# Patient Record
Sex: MALE | Race: WHITE | NOT HISPANIC OR LATINO | Employment: FULL TIME | URBAN - METROPOLITAN AREA
[De-identification: names, ages, dates, MRNs, and addresses within clinical notes are randomized per-mention and may not be internally consistent; named-entity substitution may affect disease eponyms.]

---

## 2018-04-12 ENCOUNTER — OFFICE VISIT (OUTPATIENT)
Dept: URGENT CARE | Facility: PHYSICIAN GROUP | Age: 33
End: 2018-04-12
Payer: COMMERCIAL

## 2018-04-12 VITALS
OXYGEN SATURATION: 96 % | HEART RATE: 46 BPM | SYSTOLIC BLOOD PRESSURE: 100 MMHG | WEIGHT: 201.4 LBS | TEMPERATURE: 98.4 F | BODY MASS INDEX: 25.85 KG/M2 | HEIGHT: 74 IN | DIASTOLIC BLOOD PRESSURE: 70 MMHG

## 2018-04-12 DIAGNOSIS — M54.42 ACUTE LEFT-SIDED LOW BACK PAIN WITH LEFT-SIDED SCIATICA: ICD-10-CM

## 2018-04-12 PROCEDURE — 99214 OFFICE O/P EST MOD 30 MIN: CPT | Performed by: PHYSICIAN ASSISTANT

## 2018-04-12 RX ORDER — METHYLPREDNISOLONE 4 MG/1
TABLET ORAL
Qty: 21 TAB | Refills: 0 | Status: SHIPPED | OUTPATIENT
Start: 2018-04-12 | End: 2018-09-26

## 2018-04-12 ASSESSMENT — ENCOUNTER SYMPTOMS
BACK PAIN: 1
LEG PAIN: 1
CARDIOVASCULAR NEGATIVE: 1
CHANGE IN BOWEL HABIT: 0
RESPIRATORY NEGATIVE: 1
GASTROINTESTINAL NEGATIVE: 1
CONSTITUTIONAL NEGATIVE: 1

## 2018-04-12 ASSESSMENT — PAIN SCALES - GENERAL: PAINLEVEL: 8=MODERATE-SEVERE PAIN

## 2018-04-12 NOTE — PROGRESS NOTES
"Subjective:      Leroy Thao is a 33 y.o. male who presents with Leg Pain (L knee pain, tender, painfiul when extending leg, onset 30 hrs)            Leg Pain   This is a new problem. The current episode started yesterday. The problem occurs constantly. The problem has been unchanged. Pertinent negatives include no change in bowel habit. The symptoms are aggravated by walking and bending. He has tried acetaminophen for the symptoms. The treatment provided mild relief.   Atraumatic left lumbar pain extending into his left SI joint and down his posterior leg into his calf. No injuries, falls. Denies bowel/bladder incontinence, Perianal numbness. Fevers, chills, abdominal pain, chest pain. No previous injuries.      PMH:  has no past medical history on file.  MEDS:   Current Outpatient Prescriptions:   •  hydrocodone/acetaminophen (NORCO)  MG Tab, Take 1-2 Tabs by mouth every 6 hours as needed., Disp: 10 Tab, Rfl: 0  ALLERGIES: No Known Allergies  SURGHX: History reviewed. No pertinent surgical history.  SOCHX:  reports that he has never smoked. He has never used smokeless tobacco. He reports that he drinks alcohol. He reports that he does not use drugs.  FH: family history is not on file.      Review of Systems   Constitutional: Negative.    HENT: Negative.    Respiratory: Negative.    Cardiovascular: Negative.    Gastrointestinal: Negative.  Negative for change in bowel habit.   Genitourinary: Negative.    Musculoskeletal: Positive for back pain.       Medications, Allergies, and current problem list reviewed today in Epic     Objective:     /70   Pulse (!) 46   Temp 36.9 °C (98.4 °F)   Ht 1.88 m (6' 2\")   Wt 91.4 kg (201 lb 6.4 oz)   SpO2 96%   BMI 25.86 kg/m²      Physical Exam   Constitutional: He is oriented to person, place, and time. He appears well-developed and well-nourished. No distress.   HENT:   Head: Normocephalic and atraumatic.   Right Ear: External ear normal.   Left Ear: External " ear normal.   Nose: Nose normal.   Mouth/Throat: Oropharynx is clear and moist. No oropharyngeal exudate.   Eyes: Right conjunctiva is not injected. Left conjunctiva is not injected.   Neck: Normal range of motion. Neck supple.   Cardiovascular: Normal rate, regular rhythm and normal heart sounds.    No murmur heard.  Pulmonary/Chest: Effort normal and breath sounds normal. No respiratory distress. He has no wheezes. He exhibits no tenderness.   Musculoskeletal:        Lumbar back: He exhibits decreased range of motion, tenderness and pain. He exhibits no bony tenderness, no swelling and no spasm.        Back:    Left lumbar/SI joint tenderness. No midline tenderness. Straight leg raise positive. Lower extremity strength and DTRs equal. Nonantalgic gait. Forward flexion within normal limits.   Neurological: He is alert and oriented to person, place, and time.   Skin: Skin is warm and dry. He is not diaphoretic.   Psychiatric: He has a normal mood and affect. His behavior is normal. Judgment and thought content normal.   Nursing note and vitals reviewed.              Assessment/Plan:     1. Acute left-sided low back pain with left-sided sciatica  MethylPREDNISolone (MEDROL DOSEPAK) 4 MG Tablet Therapy Pack     Vital signs normal, no red flag symptoms. Left-sided sciatica  Trial Medrol Dosepak  OTC meds and conservative measures as discussed, handout given  Return to clinic or go to ED if symptoms worsen or persist. Indications for ED discussed at length. Patient voices understanding. Follow-up with your primary care provider in 3-5 days. Red flags discussed. All side effects of medication discussed including allergic response, GI upset, tendon injury, etc.    Please note that this dictation was created using voice recognition software. I have made every reasonable attempt to correct obvious errors, but I expect that there are errors of grammar and possibly content that I did not discover before finalizing the  note.

## 2018-04-16 ENCOUNTER — OFFICE VISIT (OUTPATIENT)
Dept: URGENT CARE | Facility: PHYSICIAN GROUP | Age: 33
End: 2018-04-16
Payer: COMMERCIAL

## 2018-04-16 VITALS
OXYGEN SATURATION: 95 % | HEIGHT: 74 IN | HEART RATE: 60 BPM | SYSTOLIC BLOOD PRESSURE: 116 MMHG | BODY MASS INDEX: 25.67 KG/M2 | WEIGHT: 200 LBS | TEMPERATURE: 98.2 F | DIASTOLIC BLOOD PRESSURE: 70 MMHG

## 2018-04-16 DIAGNOSIS — M54.32 SCIATICA OF LEFT SIDE: Primary | ICD-10-CM

## 2018-04-16 PROCEDURE — 99214 OFFICE O/P EST MOD 30 MIN: CPT | Performed by: NURSE PRACTITIONER

## 2018-04-16 RX ORDER — HYDROCODONE BITARTRATE AND ACETAMINOPHEN 7.5; 325 MG/1; MG/1
TABLET ORAL
Refills: 0 | COMMUNITY
Start: 2018-03-05 | End: 2018-09-26

## 2018-04-16 RX ORDER — CYCLOBENZAPRINE HCL 10 MG
10 TABLET ORAL 3 TIMES DAILY PRN
Qty: 30 TAB | Refills: 0 | Status: SHIPPED | OUTPATIENT
Start: 2018-04-16 | End: 2018-09-26

## 2018-04-16 ASSESSMENT — ENCOUNTER SYMPTOMS
LEG PAIN: 1
FEVER: 0
WEAKNESS: 0
BOWEL INCONTINENCE: 0
MYALGIAS: 0
NECK PAIN: 0
PERIANAL NUMBNESS: 0
CHILLS: 0
TINGLING: 1
SENSORY CHANGE: 0
PARESIS: 0
FOCAL WEAKNESS: 0
ABDOMINAL PAIN: 0
BACK PAIN: 1
PARESTHESIAS: 0

## 2018-04-16 NOTE — PROGRESS NOTES
"Subjective:      Leroy Thao is a 33 y.o. 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 Requesting alt medication and work restrictions)            Medications, Allergies and Prior Medical Hx reviewed and updated in [facility].with patient/family today         Back Pain   This is a new problem. The current episode started in the past 7 days (5 days). The problem occurs constantly. The problem is unchanged. The pain is present in the sacro-iliac. The pain radiates to the left thigh, left knee and left foot. The pain is at a severity of 8/10. The symptoms are aggravated by bending and twisting. Associated symptoms include leg pain and tingling. Pertinent negatives include no abdominal pain, bladder incontinence, bowel incontinence, dysuria, fever, paresis, paresthesias, perianal numbness or weakness. Risk factors include history of cancer. He has tried bed rest and heat for the symptoms. The treatment provided mild relief.       Review of Systems   Constitutional: Negative for chills and fever.   Gastrointestinal: Negative for abdominal pain and bowel incontinence.   Genitourinary: Negative for bladder incontinence and dysuria.   Musculoskeletal: Positive for back pain. Negative for joint pain, myalgias and neck pain.   Neurological: Positive for tingling. Negative for sensory change, focal weakness, weakness and paresthesias.          Objective:     /70   Pulse 60   Temp 36.8 °C (98.2 °F)   Ht 1.88 m (6' 2\")   Wt 90.7 kg (200 lb)   SpO2 95%   BMI 25.68 kg/m²      Physical Exam   Constitutional: He appears well-developed and well-nourished. No distress.   HENT:   Head: Normocephalic and atraumatic.   Eyes: Conjunctivae are normal. Pupils are equal, round, and reactive to light.   Neck: Neck supple.   Cardiovascular: Normal rate.    Pulmonary/Chest: Effort normal. No respiratory distress.   Musculoskeletal:   " Lumbar back: He exhibits decreased range of motion, tenderness and pain. He exhibits no bony tenderness, no swelling, no edema, no deformity and no laceration.        Back:     pain with tenderness at midline lumbar radiating down the left leg, flex ext of feet strong and equal, sensation grossly intact, patellar reflexes +2 bilat.      Neurological: He is alert.   Awake, alert, answering questions appropriately, moving all extremeties   Skin: Skin is warm and dry. Capillary refill takes less than 2 seconds. No erythema.   Psychiatric: He has a normal mood and affect. His behavior is normal.   Vitals reviewed.              Assessment/Plan:     1. Sciatica of left side  cyclobenzaprine (FLEXERIL) 10 MG Tab       Rest, Fluids, heat/cold packs, remain active with no heavy lifting or strenuous activty  Do not drink alcohol or operate machinery with this medication  Pt will go to the ER for worsening or changing symptoms as discussed,   Follow-up with your primary care provider or return here if not improving in 5 days   Discharge instructions discussed with pt/family who verbalize understanding and agreement with poc

## 2018-09-26 DIAGNOSIS — Z01.810 PRE-OPERATIVE CARDIOVASCULAR EXAMINATION: ICD-10-CM

## 2018-09-26 DIAGNOSIS — Z01.812 PRE-PROCEDURAL LABORATORY EXAMINATION: ICD-10-CM

## 2018-09-26 LAB
ANION GAP SERPL CALC-SCNC: 9 MMOL/L (ref 0–11.9)
APPEARANCE UR: CLEAR
APTT PPP: 30 SEC (ref 24.7–36)
BASOPHILS # BLD AUTO: 0.4 % (ref 0–1.8)
BASOPHILS # BLD: 0.03 K/UL (ref 0–0.12)
BILIRUB UR QL STRIP.AUTO: NEGATIVE
BUN SERPL-MCNC: 25 MG/DL (ref 8–22)
CALCIUM SERPL-MCNC: 9.6 MG/DL (ref 8.5–10.5)
CHLORIDE SERPL-SCNC: 102 MMOL/L (ref 96–112)
CO2 SERPL-SCNC: 27 MMOL/L (ref 20–33)
COLOR UR: YELLOW
CREAT SERPL-MCNC: 1.45 MG/DL (ref 0.5–1.4)
EOSINOPHIL # BLD AUTO: 0.22 K/UL (ref 0–0.51)
EOSINOPHIL NFR BLD: 3 % (ref 0–6.9)
ERYTHROCYTE [DISTWIDTH] IN BLOOD BY AUTOMATED COUNT: 40.2 FL (ref 35.9–50)
GLUCOSE SERPL-MCNC: 111 MG/DL (ref 65–99)
GLUCOSE UR STRIP.AUTO-MCNC: NEGATIVE MG/DL
HCT VFR BLD AUTO: 45.5 % (ref 42–52)
HGB BLD-MCNC: 15.7 G/DL (ref 14–18)
IMM GRANULOCYTES # BLD AUTO: 0.02 K/UL (ref 0–0.11)
IMM GRANULOCYTES NFR BLD AUTO: 0.3 % (ref 0–0.9)
INR PPP: 1.06 (ref 0.87–1.13)
KETONES UR STRIP.AUTO-MCNC: NEGATIVE MG/DL
LEUKOCYTE ESTERASE UR QL STRIP.AUTO: NEGATIVE
LYMPHOCYTES # BLD AUTO: 2.73 K/UL (ref 1–4.8)
LYMPHOCYTES NFR BLD: 37.7 % (ref 22–41)
MCH RBC QN AUTO: 31.6 PG (ref 27–33)
MCHC RBC AUTO-ENTMCNC: 34.5 G/DL (ref 33.7–35.3)
MCV RBC AUTO: 91.5 FL (ref 81.4–97.8)
MICRO URNS: NORMAL
MONOCYTES # BLD AUTO: 0.59 K/UL (ref 0–0.85)
MONOCYTES NFR BLD AUTO: 8.1 % (ref 0–13.4)
NEUTROPHILS # BLD AUTO: 3.66 K/UL (ref 1.82–7.42)
NEUTROPHILS NFR BLD: 50.5 % (ref 44–72)
NITRITE UR QL STRIP.AUTO: NEGATIVE
NRBC # BLD AUTO: 0 K/UL
NRBC BLD-RTO: 0 /100 WBC
PH UR STRIP.AUTO: 6.5 [PH]
PLATELET # BLD AUTO: 257 K/UL (ref 164–446)
PMV BLD AUTO: 10.1 FL (ref 9–12.9)
POTASSIUM SERPL-SCNC: 3.8 MMOL/L (ref 3.6–5.5)
PROT UR QL STRIP: NEGATIVE MG/DL
PROTHROMBIN TIME: 13.9 SEC (ref 12–14.6)
RBC # BLD AUTO: 4.97 M/UL (ref 4.7–6.1)
RBC UR QL AUTO: NEGATIVE
SODIUM SERPL-SCNC: 138 MMOL/L (ref 135–145)
SP GR UR STRIP.AUTO: 1.01
UROBILINOGEN UR STRIP.AUTO-MCNC: 0.2 MG/DL
WBC # BLD AUTO: 7.3 K/UL (ref 4.8–10.8)

## 2018-09-26 PROCEDURE — 85730 THROMBOPLASTIN TIME PARTIAL: CPT

## 2018-09-26 PROCEDURE — 93005 ELECTROCARDIOGRAM TRACING: CPT

## 2018-09-26 PROCEDURE — 93010 ELECTROCARDIOGRAM REPORT: CPT | Performed by: INTERNAL MEDICINE

## 2018-09-26 PROCEDURE — 36415 COLL VENOUS BLD VENIPUNCTURE: CPT

## 2018-09-26 PROCEDURE — 85610 PROTHROMBIN TIME: CPT

## 2018-09-26 PROCEDURE — 85025 COMPLETE CBC W/AUTO DIFF WBC: CPT

## 2018-09-26 PROCEDURE — 80048 BASIC METABOLIC PNL TOTAL CA: CPT

## 2018-09-26 PROCEDURE — 81003 URINALYSIS AUTO W/O SCOPE: CPT

## 2018-09-27 LAB — EKG IMPRESSION: NORMAL

## 2018-10-03 ENCOUNTER — APPOINTMENT (OUTPATIENT)
Dept: RADIOLOGY | Facility: MEDICAL CENTER | Age: 33
End: 2018-10-03
Attending: NEUROLOGICAL SURGERY
Payer: COMMERCIAL

## 2018-10-03 ENCOUNTER — HOSPITAL ENCOUNTER (OUTPATIENT)
Facility: MEDICAL CENTER | Age: 33
End: 2018-10-03
Attending: NEUROLOGICAL SURGERY | Admitting: NEUROLOGICAL SURGERY
Payer: COMMERCIAL

## 2018-10-03 VITALS
DIASTOLIC BLOOD PRESSURE: 64 MMHG | HEIGHT: 73 IN | BODY MASS INDEX: 26.97 KG/M2 | OXYGEN SATURATION: 98 % | RESPIRATION RATE: 14 BRPM | SYSTOLIC BLOOD PRESSURE: 124 MMHG | WEIGHT: 203.48 LBS | HEART RATE: 58 BPM | TEMPERATURE: 97.1 F

## 2018-10-03 DIAGNOSIS — G89.18 ACUTE POST-OPERATIVE PAIN: ICD-10-CM

## 2018-10-03 LAB
ANION GAP SERPL CALC-SCNC: 6 MMOL/L (ref 0–11.9)
BUN SERPL-MCNC: 21 MG/DL (ref 8–22)
CALCIUM SERPL-MCNC: 9.7 MG/DL (ref 8.5–10.5)
CHLORIDE SERPL-SCNC: 107 MMOL/L (ref 96–112)
CO2 SERPL-SCNC: 24 MMOL/L (ref 20–33)
CREAT SERPL-MCNC: 1.3 MG/DL (ref 0.5–1.4)
GLUCOSE SERPL-MCNC: 90 MG/DL (ref 65–99)
POTASSIUM SERPL-SCNC: 4.1 MMOL/L (ref 3.6–5.5)
SODIUM SERPL-SCNC: 137 MMOL/L (ref 135–145)

## 2018-10-03 PROCEDURE — 160025 RECOVERY II MINUTES (STATS): Performed by: NEUROLOGICAL SURGERY

## 2018-10-03 PROCEDURE — A9270 NON-COVERED ITEM OR SERVICE: HCPCS | Performed by: ANESTHESIOLOGY

## 2018-10-03 PROCEDURE — 700111 HCHG RX REV CODE 636 W/ 250 OVERRIDE (IP)

## 2018-10-03 PROCEDURE — 700102 HCHG RX REV CODE 250 W/ 637 OVERRIDE(OP)

## 2018-10-03 PROCEDURE — 160009 HCHG ANES TIME/MIN: Performed by: NEUROLOGICAL SURGERY

## 2018-10-03 PROCEDURE — 700101 HCHG RX REV CODE 250

## 2018-10-03 PROCEDURE — 160035 HCHG PACU - 1ST 60 MINS PHASE I: Performed by: NEUROLOGICAL SURGERY

## 2018-10-03 PROCEDURE — 700111 HCHG RX REV CODE 636 W/ 250 OVERRIDE (IP): Performed by: ANESTHESIOLOGY

## 2018-10-03 PROCEDURE — A9270 NON-COVERED ITEM OR SERVICE: HCPCS

## 2018-10-03 PROCEDURE — 160046 HCHG PACU - 1ST 60 MINS PHASE II: Performed by: NEUROLOGICAL SURGERY

## 2018-10-03 PROCEDURE — 500002 HCHG ADHESIVE, DERMABOND: Performed by: NEUROLOGICAL SURGERY

## 2018-10-03 PROCEDURE — 700102 HCHG RX REV CODE 250 W/ 637 OVERRIDE(OP): Performed by: ANESTHESIOLOGY

## 2018-10-03 PROCEDURE — 160029 HCHG SURGERY MINUTES - 1ST 30 MINS LEVEL 4: Performed by: NEUROLOGICAL SURGERY

## 2018-10-03 PROCEDURE — A6402 STERILE GAUZE <= 16 SQ IN: HCPCS | Performed by: NEUROLOGICAL SURGERY

## 2018-10-03 PROCEDURE — 72020 X-RAY EXAM OF SPINE 1 VIEW: CPT

## 2018-10-03 PROCEDURE — 500885 HCHG PACK, JACKSON TABLE: Performed by: NEUROLOGICAL SURGERY

## 2018-10-03 PROCEDURE — 110454 HCHG SHELL REV 250: Performed by: NEUROLOGICAL SURGERY

## 2018-10-03 PROCEDURE — 501838 HCHG SUTURE GENERAL: Performed by: NEUROLOGICAL SURGERY

## 2018-10-03 PROCEDURE — 160041 HCHG SURGERY MINUTES - EA ADDL 1 MIN LEVEL 4: Performed by: NEUROLOGICAL SURGERY

## 2018-10-03 PROCEDURE — 160048 HCHG OR STATISTICAL LEVEL 1-5: Performed by: NEUROLOGICAL SURGERY

## 2018-10-03 PROCEDURE — 160002 HCHG RECOVERY MINUTES (STAT): Performed by: NEUROLOGICAL SURGERY

## 2018-10-03 PROCEDURE — 80048 BASIC METABOLIC PNL TOTAL CA: CPT

## 2018-10-03 RX ORDER — IPRATROPIUM BROMIDE AND ALBUTEROL SULFATE 2.5; .5 MG/3ML; MG/3ML
3 SOLUTION RESPIRATORY (INHALATION)
Status: DISCONTINUED | OUTPATIENT
Start: 2018-10-03 | End: 2018-10-03 | Stop reason: HOSPADM

## 2018-10-03 RX ORDER — LIDOCAINE HYDROCHLORIDE 10 MG/ML
INJECTION, SOLUTION INFILTRATION; PERINEURAL
Status: COMPLETED
Start: 2018-10-03 | End: 2018-10-03

## 2018-10-03 RX ORDER — METHYLPREDNISOLONE SODIUM SUCCINATE 125 MG/2ML
INJECTION, POWDER, LYOPHILIZED, FOR SOLUTION INTRAMUSCULAR; INTRAVENOUS
Status: DISCONTINUED | OUTPATIENT
Start: 2018-10-03 | End: 2018-10-03 | Stop reason: HOSPADM

## 2018-10-03 RX ORDER — HYDROMORPHONE HYDROCHLORIDE 2 MG/ML
0.1 INJECTION, SOLUTION INTRAMUSCULAR; INTRAVENOUS; SUBCUTANEOUS
Status: DISCONTINUED | OUTPATIENT
Start: 2018-10-03 | End: 2018-10-03 | Stop reason: HOSPADM

## 2018-10-03 RX ORDER — ONDANSETRON 2 MG/ML
4 INJECTION INTRAMUSCULAR; INTRAVENOUS
Status: DISCONTINUED | OUTPATIENT
Start: 2018-10-03 | End: 2018-10-03 | Stop reason: HOSPADM

## 2018-10-03 RX ORDER — OXYCODONE HYDROCHLORIDE 5 MG/1
10 TABLET ORAL
Status: DISCONTINUED | OUTPATIENT
Start: 2018-10-03 | End: 2018-10-03 | Stop reason: HOSPADM

## 2018-10-03 RX ORDER — BACITRACIN 50000 [IU]/1
INJECTION, POWDER, FOR SOLUTION INTRAMUSCULAR
Status: DISCONTINUED | OUTPATIENT
Start: 2018-10-03 | End: 2018-10-03 | Stop reason: HOSPADM

## 2018-10-03 RX ORDER — SODIUM CHLORIDE, SODIUM LACTATE, POTASSIUM CHLORIDE, CALCIUM CHLORIDE 600; 310; 30; 20 MG/100ML; MG/100ML; MG/100ML; MG/100ML
INJECTION, SOLUTION INTRAVENOUS CONTINUOUS
Status: ACTIVE | OUTPATIENT
Start: 2018-10-03 | End: 2018-10-03

## 2018-10-03 RX ORDER — CYCLOBENZAPRINE HCL 10 MG
10 TABLET ORAL 3 TIMES DAILY PRN
Status: DISCONTINUED | OUTPATIENT
Start: 2018-10-03 | End: 2018-10-03 | Stop reason: HOSPADM

## 2018-10-03 RX ORDER — HYDROMORPHONE HYDROCHLORIDE 2 MG/ML
0.4 INJECTION, SOLUTION INTRAMUSCULAR; INTRAVENOUS; SUBCUTANEOUS
Status: DISCONTINUED | OUTPATIENT
Start: 2018-10-03 | End: 2018-10-03 | Stop reason: HOSPADM

## 2018-10-03 RX ORDER — MEPERIDINE HYDROCHLORIDE 25 MG/ML
12.5 INJECTION INTRAMUSCULAR; INTRAVENOUS; SUBCUTANEOUS
Status: DISCONTINUED | OUTPATIENT
Start: 2018-10-03 | End: 2018-10-03 | Stop reason: HOSPADM

## 2018-10-03 RX ORDER — OXYCODONE HYDROCHLORIDE AND ACETAMINOPHEN 5; 325 MG/1; MG/1
1 TABLET ORAL EVERY 4 HOURS PRN
Status: DISCONTINUED | OUTPATIENT
Start: 2018-10-03 | End: 2018-10-03 | Stop reason: HOSPADM

## 2018-10-03 RX ORDER — BUPIVACAINE HYDROCHLORIDE AND EPINEPHRINE 5; 5 MG/ML; UG/ML
INJECTION, SOLUTION EPIDURAL; INTRACAUDAL; PERINEURAL
Status: DISCONTINUED | OUTPATIENT
Start: 2018-10-03 | End: 2018-10-03 | Stop reason: HOSPADM

## 2018-10-03 RX ORDER — ACETAMINOPHEN 500 MG
1000 TABLET ORAL ONCE
Status: COMPLETED | OUTPATIENT
Start: 2018-10-03 | End: 2018-10-03

## 2018-10-03 RX ORDER — OXYCODONE HYDROCHLORIDE 5 MG/1
5 TABLET ORAL
Status: DISCONTINUED | OUTPATIENT
Start: 2018-10-03 | End: 2018-10-03 | Stop reason: HOSPADM

## 2018-10-03 RX ORDER — OXYCODONE HCL 5 MG/5 ML
5 SOLUTION, ORAL ORAL
Status: COMPLETED | OUTPATIENT
Start: 2018-10-03 | End: 2018-10-03

## 2018-10-03 RX ORDER — HYDROMORPHONE HYDROCHLORIDE 2 MG/ML
0.2 INJECTION, SOLUTION INTRAMUSCULAR; INTRAVENOUS; SUBCUTANEOUS
Status: DISCONTINUED | OUTPATIENT
Start: 2018-10-03 | End: 2018-10-03 | Stop reason: HOSPADM

## 2018-10-03 RX ORDER — OXYCODONE HCL 5 MG/5 ML
SOLUTION, ORAL ORAL
Status: COMPLETED
Start: 2018-10-03 | End: 2018-10-03

## 2018-10-03 RX ORDER — HALOPERIDOL 5 MG/ML
1 INJECTION INTRAMUSCULAR
Status: DISCONTINUED | OUTPATIENT
Start: 2018-10-03 | End: 2018-10-03 | Stop reason: HOSPADM

## 2018-10-03 RX ORDER — SODIUM CHLORIDE, SODIUM LACTATE, POTASSIUM CHLORIDE, CALCIUM CHLORIDE 600; 310; 30; 20 MG/100ML; MG/100ML; MG/100ML; MG/100ML
INJECTION, SOLUTION INTRAVENOUS CONTINUOUS
Status: DISCONTINUED | OUTPATIENT
Start: 2018-10-03 | End: 2018-10-03 | Stop reason: HOSPADM

## 2018-10-03 RX ORDER — GABAPENTIN 300 MG/1
300 CAPSULE ORAL ONCE
Status: COMPLETED | OUTPATIENT
Start: 2018-10-03 | End: 2018-10-03

## 2018-10-03 RX ORDER — OXYCODONE HYDROCHLORIDE AND ACETAMINOPHEN 5; 325 MG/1; MG/1
1 TABLET ORAL EVERY 4 HOURS PRN
Qty: 42 TAB | Refills: 0 | Status: SHIPPED | OUTPATIENT
Start: 2018-10-03 | End: 2018-10-10

## 2018-10-03 RX ORDER — OXYCODONE HCL 5 MG/5 ML
10 SOLUTION, ORAL ORAL
Status: COMPLETED | OUTPATIENT
Start: 2018-10-03 | End: 2018-10-03

## 2018-10-03 RX ORDER — CYCLOBENZAPRINE HCL 10 MG
10 TABLET ORAL 3 TIMES DAILY PRN
Qty: 30 TAB | Refills: 0 | Status: ON HOLD | OUTPATIENT
Start: 2018-10-03 | End: 2021-10-12

## 2018-10-03 RX ADMIN — HYDROMORPHONE HYDROCHLORIDE 0.4 MG: 2 INJECTION INTRAMUSCULAR; INTRAVENOUS; SUBCUTANEOUS at 11:43

## 2018-10-03 RX ADMIN — GABAPENTIN 300 MG: 300 CAPSULE ORAL at 08:38

## 2018-10-03 RX ADMIN — HYDROMORPHONE HYDROCHLORIDE 0.4 MG: 2 INJECTION INTRAMUSCULAR; INTRAVENOUS; SUBCUTANEOUS at 11:38

## 2018-10-03 RX ADMIN — LIDOCAINE HYDROCHLORIDE 0.5 ML: 10 INJECTION, SOLUTION INFILTRATION; PERINEURAL at 08:55

## 2018-10-03 RX ADMIN — Medication 10 MG: at 11:33

## 2018-10-03 RX ADMIN — FENTANYL CITRATE 50 MCG: 50 INJECTION, SOLUTION INTRAMUSCULAR; INTRAVENOUS at 11:34

## 2018-10-03 RX ADMIN — SODIUM CHLORIDE, SODIUM LACTATE, POTASSIUM CHLORIDE, CALCIUM CHLORIDE: 600; 310; 30; 20 INJECTION, SOLUTION INTRAVENOUS at 08:55

## 2018-10-03 RX ADMIN — OXYCODONE HYDROCHLORIDE 10 MG: 5 SOLUTION ORAL at 11:33

## 2018-10-03 RX ADMIN — Medication 0.5 ML: at 08:55

## 2018-10-03 RX ADMIN — ACETAMINOPHEN 1000 MG: 500 TABLET, FILM COATED ORAL at 08:38

## 2018-10-03 ASSESSMENT — PAIN SCALES - GENERAL
PAINLEVEL_OUTOF10: 4
PAINLEVEL_OUTOF10: 3
PAINLEVEL_OUTOF10: 3
PAINLEVEL_OUTOF10: 8
PAINLEVEL_OUTOF10: 4

## 2018-10-03 NOTE — DISCHARGE INSTRUCTIONS
ACTIVITY: Rest and take it easy for the first 24 hours.  A responsible adult is recommended to remain with you during that time.  It is normal to feel sleepy.  We encourage you to not do anything that requires balance, judgment or coordination.    MILD FLU-LIKE SYMPTOMS ARE NORMAL. YOU MAY EXPERIENCE GENERALIZED MUSCLE ACHES, THROAT IRRITATION, HEADACHE AND/OR SOME NAUSEA.    FOR 24 HOURS DO NOT:  Drive, operate machinery or run household appliances.  Drink beer or alcoholic beverages.   Make important decisions or sign legal documents.    SPECIAL INSTRUCTIONS & SURGICAL DRESSING/BATHING:   *Okay to shower; pat incision dry - do not rub    Lumbar Laminectomy, Care After  Refer to this sheet in the next few weeks. These instructions provide you with information on caring for yourself after your procedure. Your health care provider may also give you more specific instructions. Your treatment has been planned according to current medical practices, but problems sometimes occur. Call your health care provider if you have any problems or questions after your procedure.  HOME CARE INSTRUCTIONS   · Check the incision twice a day for signs of infection. Some signs may include a foul-smelling, greenish or yellowish discharge from the wound, increased pain, or increased redness over the incision.  · Change your bandages about 24-36 hours after surgery, or as directed.  · You may shower once the bandage is removed, or as directed. Avoid tub baths, swimming, and hot tubs for 3 weeks or until your incision has healed completely. If you have stitches or staples, they may be removed 2-3 weeks after surgery, or as directed by your doctor.  · Daily exercise is helpful to prevent the return of problems. Walking is permitted. You may use a treadmill without an incline. Cut down on activities and exercise if you have discomfort. You may also go up and down stairs as much as you can tolerate.  · Do not lift anything heavier than 15  lb. Avoid bending or twisting at the waist. Always bend your knees.  · Maintain strength and range of motion as instructed.  · Do not drive for 2-3 weeks, or as directed by your doctor. You may be a passenger for 20-30 minutes at a time. Lying back in the passenger seat may be more comfortable for you.  · Limit your sitting to intervals of 20-30 minutes. You should lie down or walk in between sitting periods. There are no limitations for sitting in a recliner chair.  · Only take over-the-counter or prescription medicines for pain, discomfort, or fever as directed by your health care provider.  SEEK MEDICAL CARE IF:   · There is increased bleeding (more than a small spot) from the wound.  · You notice redness, swelling, or increasing pain in the wound.  · Pus is coming from the wound.  · You have a fever for more than 2-3 days.  · You notice a foul smell coming from the wound or dressing.  · You have increasing pain in your wound.  SEEK IMMEDIATE MEDICAL CARE IF:   · You develop a rash.  · You have difficulty breathing.  · You have any allergic problems.  · You develop a headache or stiff neck that does not respond to pain relievers.  · You are unable to urinate.  · You develop new onset of pain, numbness, or weakness in the buttocks or lower extremities.  MAKE SURE YOU:   · Understand these instructions.  · Will watch your condition.  · Will get help right away if you are not doing well or get worse.     This information is not intended to replace advice given to you by your health care provider. Make sure you discuss any questions you have with your health care provider.     Document Released: 11/21/2005 Document Revised: 08/20/2014 Document Reviewed: 05/15/2014  Played Interactive Patient Education ©2016 [employer]      DIET: To avoid nausea, slowly advance diet as tolerated, avoiding spicy or greasy foods for the first day.  Add more substantial food to your diet according to your physician's instructions.   Babies can be fed formula or breast milk as soon as they are hungry.  INCREASE FLUIDS AND FIBER TO AVOID CONSTIPATION.    FOLLOW-UP APPOINTMENT:  A follow-up appointment should be arranged with your doctor in 1-2 weeks; call to schedule.    You should CALL YOUR PHYSICIAN if you develop:  Fever greater than 101 degrees F.  Pain not relieved by medication, or persistent nausea or vomiting.  Excessive bleeding (blood soaking through dressing) or unexpected drainage from the wound.  Extreme redness or swelling around the incision site, drainage of pus or foul smelling drainage.  Inability to urinate or empty your bladder within 8 hours.  Problems with breathing or chest pain.    You should call 911 if you develop problems with breathing or chest pain.  If you are unable to contact your doctor or surgical center, you should go to the nearest emergency room or urgent care center.  Physician's telephone #: Dr. Huerta [phone]    If any questions arise, call your doctor.  If your doctor is not available, please feel free to call the Surgical Center at [phone].  The Center is open Monday through Friday from 7AM to 7PM.  You can also call the [employer] HOTLINE open 24 hours/day, 7 days/week and speak to a nurse at [phone], or toll free at [phone].    A registered nurse may call you a few days after your surgery to see how you are doing after your procedure.    MEDICATIONS: Resume taking daily medication.  Take prescribed pain medication with food.  If no medication is prescribed, you may take non-aspirin pain medication if needed.  PAIN MEDICATION CAN BE VERY CONSTIPATING.  Take a stool softener or laxative such as senokot, pericolace, or milk of magnesia if needed.    Prescription given for Flexeril (muscle spasms), Percocet (pain).  Last pain medication given at *11:33am*.    If your physician has prescribed pain medication that includes Acetaminophen (Tylenol), do not take additional Acetaminophen  (Tylenol) while taking the prescribed medication.    Depression / Suicide Risk    As you are discharged from this [facility] facility, it is important to learn how to keep safe from harming yourself.    Recognize the warning signs:  · Abrupt changes in personality, positive or negative- including increase in energy   · Giving away possessions  · Change in eating patterns- significant weight changes-  positive or negative  · Change in sleeping patterns- unable to sleep or sleeping all the time   · Unwillingness or inability to communicate  · Depression  · Unusual sadness, discouragement and loneliness  · Talk of wanting to die  · Neglect of personal appearance   · Rebelliousness- reckless behavior  · Withdrawal from people/activities they love  · Confusion- inability to concentrate     If you or a loved one observes any of these behaviors or has concerns about self-harm, here's what you can do:  · Talk about it- your feelings and reasons for harming yourself  · Remove any means that you might use to hurt yourself (examples: pills, rope, extension cords, firearm)  · Get professional help from the community (Mental Health, Substance Abuse, psychological counseling)  · Do not be alone:Call your Safe Contact- someone whom you trust who will be there for you.  · Call your local CRISIS HOTLINE 422-1911 or 495-852-9416  · Call your local Children's Mobile Crisis Response Team [place] (868) 266-9005 or www.My[place]  · Call the toll free National Suicide Prevention Hotlines   · National Suicide Prevention Lifeline 831-709-QUFB (1739)  · National Hope Line Network 800-SUICIDE (417-2635)

## 2018-10-03 NOTE — PROGRESS NOTES
Med rec updated and complete  Allergies reviewed  Interviewed pt with wife at bedside with permission from pt.  Pt reports no prescription medications, OTC's, or vitamins.  Pt reports no antibiotics in the last 30 days.

## 2018-10-03 NOTE — OR NURSING
1235 - Pt denies numbness/tingling in feet, pain is tolerable at a 4/10. Pt's wife at bedside. Pt verbalizes readiness for discharge. Instructions reviewed with pt and pt's wife.     1240 - No further needs. Pt would like to walk out, gait steady, CNA at side.

## 2018-10-03 NOTE — OR SURGEON
Immediate Post OP Note    PreOp Diagnosis: left lumbar radiculopathy, HNP    PostOp Diagnosis: same    Procedure(s):  LUMBAR LAMINECTOMY DISKECTOMY - L5-S1 MICRODISKECTOMY - Wound Class: Clean    Surgeon(s):  Cristóbal Huerta M.D.    Anesthesiologist/Type of Anesthesia:  Anesthesiologist: Vianney Loomis M.D./General    Surgical Staff:  Circulator: Kathi Delcid R.N.  Scrub Person: Dixie Diaz  Radiology Technologist: Sameera Monson    Specimens removed if any:  * No specimens in log *    Estimated Blood Loss: 10cc    Findings: good decompression    Complications: none        10/3/2018 11:20 AM NALINI Rodriguez

## 2018-10-03 NOTE — PROGRESS NOTES
Prescription for narcotics given on discharge  - no abarrant activity  ORT=low risk  Consent to be signed on discharge

## 2018-10-04 NOTE — OP REPORT
DATE OF SERVICE:  10/03/2018    PREOPERATIVE DIAGNOSIS:  Recalcitrant left-sided lumbosacral radiculopathy.    POSTOPERATIVE DIAGNOSIS:  Recalcitrant left-sided lumbosacral radiculopathy.    PROCEDURES PERFORMED:  1.  Left-sided L5-S1 microdiskectomy.  2.  Use of operative microscope for microdissection.    SURGEON:  Cristóbal Huerta MD    ASSISTANT:  DARYL Cheung    ANESTHESIA:  General.    COMPLICATIONS:  None.    ESTIMATED BLOOD LOSS:  25 mL.    DESCRIPTION OF PROCEDURE:  Patient was brought to the operating room,   identified in the usual fashion.  General endotracheal anesthesia was induced   by the anesthesia team.  Patient was then placed prone on the Alcides table   with bolsters.  All pressure points were meticulously padded.  Midline lumbar   incision was marked in the skin using fluoroscopic guidance.  He was then   prepped and draped in usual sterile fashion.  Local anesthesia was infiltrated   in the subcutaneous tissue.  A 10 blade was used to incise the skin.    Dissection was carried down in subperiosteal fashion on the left side only   using Bovie electrocautery.  Retractors were put in place.  Upgoing curette   was placed underneath the lamina of L5 at the level L5-S1 disk space.  Film   was taken confirming that we were at the correct level.  This was then marked   with a marking pen.  We then adjusted the retractors and brought in the   operative microscope.  We performed a standard hemilaminectomy of L5 and S1 on   the left hand side using a combination of high speed air drill, upgoing   curette to separate the ligament from bone and then using Kerrison 2 and 3   punches.  We identified the top of the S1 pedicle medially.  We then used a   nerve root retractor to retract the thecal sac and S1 nerve root shoulder   medially.  This was quite difficult because of large plexus of veins.  We had   to use significant bipolar electrocautery to try to cauterize the veins along   with FloSeal  with gentle tamponade.  We then used a 15 blade to incise the   disk space and then were able to milk several quite sizable free fragments out   just above the disk space.  We then explored the disk space and used alley   and a pituitary to remove additional disk contents.  Then, antibiotic   irrigation into the disk space to free up any additional free fragments, which   were then removed with an alley and a pituitary and a peapod.  We then used a   double ball probe to confirm we had excellent decompression of the central   canal, left lateral recess and then the left L5 and S1 nerve roots were free   and clear.  Once they were, we then used more FloSeal with gentle tamponade   for hemostasis.  We left epidural Solu-Medrol and fentanyl into the incision.    All retractors were removed.  Hemostasis was pristine.  We then closed the   incision in layers and topped with Dermabond.  All sponge and needle counts   were correct x2 at the end the case.  I was present and scrubbed for the   entire procedure.  Patient awakened and was transferred to the recovery room   in stable condition where he was found to have 5/5 strength.       ____________________________________     MARYLIN SHARIF MD    CPD / NTS    DD:  10/03/2018 16:12:33  DT:  10/03/2018 17:26:37    D#:  [id number]  Job#:  [id number]

## 2021-10-07 ENCOUNTER — TELEMEDICINE (OUTPATIENT)
Dept: ADMISSIONS | Facility: MEDICAL CENTER | Age: 36
End: 2021-10-07
Attending: OTOLARYNGOLOGY
Payer: COMMERCIAL

## 2021-10-12 ENCOUNTER — ANESTHESIA (OUTPATIENT)
Dept: SURGERY | Facility: MEDICAL CENTER | Age: 36
End: 2021-10-12
Payer: COMMERCIAL

## 2021-10-12 ENCOUNTER — HOSPITAL ENCOUNTER (OUTPATIENT)
Facility: MEDICAL CENTER | Age: 36
End: 2021-10-12
Attending: OTOLARYNGOLOGY | Admitting: OTOLARYNGOLOGY
Payer: COMMERCIAL

## 2021-10-12 ENCOUNTER — ANESTHESIA EVENT (OUTPATIENT)
Dept: SURGERY | Facility: MEDICAL CENTER | Age: 36
End: 2021-10-12
Payer: COMMERCIAL

## 2021-10-12 VITALS
HEART RATE: 45 BPM | DIASTOLIC BLOOD PRESSURE: 71 MMHG | WEIGHT: 193.78 LBS | HEIGHT: 73 IN | BODY MASS INDEX: 25.68 KG/M2 | SYSTOLIC BLOOD PRESSURE: 117 MMHG | TEMPERATURE: 97.6 F | OXYGEN SATURATION: 94 % | RESPIRATION RATE: 18 BRPM

## 2021-10-12 DIAGNOSIS — G89.18 POSTOPERATIVE PAIN: ICD-10-CM

## 2021-10-12 LAB
EXTERNAL QUALITY CONTROL: NORMAL
SARS-COV+SARS-COV-2 AG RESP QL IA.RAPID: NEGATIVE

## 2021-10-12 PROCEDURE — 700111 HCHG RX REV CODE 636 W/ 250 OVERRIDE (IP): Performed by: ANESTHESIOLOGY

## 2021-10-12 PROCEDURE — A9270 NON-COVERED ITEM OR SERVICE: HCPCS | Performed by: ANESTHESIOLOGY

## 2021-10-12 PROCEDURE — 160002 HCHG RECOVERY MINUTES (STAT): Performed by: OTOLARYNGOLOGY

## 2021-10-12 PROCEDURE — 502573 HCHG PACK, ENT: Performed by: OTOLARYNGOLOGY

## 2021-10-12 PROCEDURE — 501404 HCHG SPLINT, NASAL DOYLE AIRWAY: Performed by: OTOLARYNGOLOGY

## 2021-10-12 PROCEDURE — 160039 HCHG SURGERY MINUTES - EA ADDL 1 MIN LEVEL 3: Performed by: OTOLARYNGOLOGY

## 2021-10-12 PROCEDURE — A9270 NON-COVERED ITEM OR SERVICE: HCPCS | Performed by: OTOLARYNGOLOGY

## 2021-10-12 PROCEDURE — 501838 HCHG SUTURE GENERAL: Performed by: OTOLARYNGOLOGY

## 2021-10-12 PROCEDURE — 700101 HCHG RX REV CODE 250: Performed by: OTOLARYNGOLOGY

## 2021-10-12 PROCEDURE — 160025 RECOVERY II MINUTES (STATS): Performed by: OTOLARYNGOLOGY

## 2021-10-12 PROCEDURE — 700102 HCHG RX REV CODE 250 W/ 637 OVERRIDE(OP): Performed by: ANESTHESIOLOGY

## 2021-10-12 PROCEDURE — 160035 HCHG PACU - 1ST 60 MINS PHASE I: Performed by: OTOLARYNGOLOGY

## 2021-10-12 PROCEDURE — 700102 HCHG RX REV CODE 250 W/ 637 OVERRIDE(OP): Performed by: OTOLARYNGOLOGY

## 2021-10-12 PROCEDURE — 700105 HCHG RX REV CODE 258: Performed by: OTOLARYNGOLOGY

## 2021-10-12 PROCEDURE — 87426 SARSCOV CORONAVIRUS AG IA: CPT | Performed by: OTOLARYNGOLOGY

## 2021-10-12 PROCEDURE — 700101 HCHG RX REV CODE 250: Performed by: ANESTHESIOLOGY

## 2021-10-12 PROCEDURE — 160048 HCHG OR STATISTICAL LEVEL 1-5: Performed by: OTOLARYNGOLOGY

## 2021-10-12 PROCEDURE — 500331 HCHG COTTONOID, SURG PATTIE: Performed by: OTOLARYNGOLOGY

## 2021-10-12 PROCEDURE — 160046 HCHG PACU - 1ST 60 MINS PHASE II: Performed by: OTOLARYNGOLOGY

## 2021-10-12 PROCEDURE — 160028 HCHG SURGERY MINUTES - 1ST 30 MINS LEVEL 3: Performed by: OTOLARYNGOLOGY

## 2021-10-12 PROCEDURE — 160009 HCHG ANES TIME/MIN: Performed by: OTOLARYNGOLOGY

## 2021-10-12 RX ORDER — HALOPERIDOL 5 MG/ML
1 INJECTION INTRAMUSCULAR
Status: DISCONTINUED | OUTPATIENT
Start: 2021-10-12 | End: 2021-10-12 | Stop reason: HOSPADM

## 2021-10-12 RX ORDER — BACITRACIN ZINC 500 [USP'U]/G
OINTMENT TOPICAL
Status: DISCONTINUED
Start: 2021-10-12 | End: 2021-10-12 | Stop reason: HOSPADM

## 2021-10-12 RX ORDER — MEPERIDINE HYDROCHLORIDE 25 MG/ML
12.5 INJECTION INTRAMUSCULAR; INTRAVENOUS; SUBCUTANEOUS
Status: DISCONTINUED | OUTPATIENT
Start: 2021-10-12 | End: 2021-10-12 | Stop reason: HOSPADM

## 2021-10-12 RX ORDER — COCAINE HYDROCHLORIDE 40 MG/ML
SOLUTION NASAL
Status: DISCONTINUED
Start: 2021-10-12 | End: 2021-10-12 | Stop reason: HOSPADM

## 2021-10-12 RX ORDER — DEXAMETHASONE SODIUM PHOSPHATE 4 MG/ML
INJECTION, SOLUTION INTRA-ARTICULAR; INTRALESIONAL; INTRAMUSCULAR; INTRAVENOUS; SOFT TISSUE PRN
Status: DISCONTINUED | OUTPATIENT
Start: 2021-10-12 | End: 2021-10-12 | Stop reason: SURG

## 2021-10-12 RX ORDER — LIDOCAINE HYDROCHLORIDE 10 MG/ML
INJECTION, SOLUTION INFILTRATION; PERINEURAL
Status: DISCONTINUED
Start: 2021-10-12 | End: 2021-10-12 | Stop reason: HOSPADM

## 2021-10-12 RX ORDER — ACETAMINOPHEN 500 MG
1000 TABLET ORAL ONCE
Status: COMPLETED | OUTPATIENT
Start: 2021-10-12 | End: 2021-10-12

## 2021-10-12 RX ORDER — OXYMETAZOLINE HYDROCHLORIDE 0.05 G/100ML
1 SPRAY NASAL
Status: COMPLETED | OUTPATIENT
Start: 2021-10-12 | End: 2021-10-12

## 2021-10-12 RX ORDER — ROCURONIUM BROMIDE 10 MG/ML
INJECTION, SOLUTION INTRAVENOUS PRN
Status: DISCONTINUED | OUTPATIENT
Start: 2021-10-12 | End: 2021-10-12 | Stop reason: SURG

## 2021-10-12 RX ORDER — HYDROMORPHONE HYDROCHLORIDE 1 MG/ML
0.4 INJECTION, SOLUTION INTRAMUSCULAR; INTRAVENOUS; SUBCUTANEOUS
Status: DISCONTINUED | OUTPATIENT
Start: 2021-10-12 | End: 2021-10-12 | Stop reason: HOSPADM

## 2021-10-12 RX ORDER — EPINEPHRINE 1 MG/ML(1)
AMPUL (ML) INJECTION
Status: DISCONTINUED
Start: 2021-10-12 | End: 2021-10-12 | Stop reason: HOSPADM

## 2021-10-12 RX ORDER — MIDAZOLAM HYDROCHLORIDE 1 MG/ML
INJECTION INTRAMUSCULAR; INTRAVENOUS PRN
Status: DISCONTINUED | OUTPATIENT
Start: 2021-10-12 | End: 2021-10-12 | Stop reason: SURG

## 2021-10-12 RX ORDER — HYDRALAZINE HYDROCHLORIDE 20 MG/ML
5 INJECTION INTRAMUSCULAR; INTRAVENOUS
Status: DISCONTINUED | OUTPATIENT
Start: 2021-10-12 | End: 2021-10-12 | Stop reason: HOSPADM

## 2021-10-12 RX ORDER — HYDROMORPHONE HYDROCHLORIDE 1 MG/ML
0.1 INJECTION, SOLUTION INTRAMUSCULAR; INTRAVENOUS; SUBCUTANEOUS
Status: DISCONTINUED | OUTPATIENT
Start: 2021-10-12 | End: 2021-10-12 | Stop reason: HOSPADM

## 2021-10-12 RX ORDER — NEOSTIGMINE METHYLSULFATE 1 MG/ML
INJECTION, SOLUTION INTRAVENOUS PRN
Status: DISCONTINUED | OUTPATIENT
Start: 2021-10-12 | End: 2021-10-12 | Stop reason: SURG

## 2021-10-12 RX ORDER — DIPHENHYDRAMINE HYDROCHLORIDE 50 MG/ML
12.5 INJECTION INTRAMUSCULAR; INTRAVENOUS
Status: DISCONTINUED | OUTPATIENT
Start: 2021-10-12 | End: 2021-10-12 | Stop reason: HOSPADM

## 2021-10-12 RX ORDER — SODIUM CHLORIDE, SODIUM LACTATE, POTASSIUM CHLORIDE, CALCIUM CHLORIDE 600; 310; 30; 20 MG/100ML; MG/100ML; MG/100ML; MG/100ML
INJECTION, SOLUTION INTRAVENOUS CONTINUOUS
Status: DISCONTINUED | OUTPATIENT
Start: 2021-10-12 | End: 2021-10-12 | Stop reason: HOSPADM

## 2021-10-12 RX ORDER — HYDROCODONE BITARTRATE AND ACETAMINOPHEN 5; 325 MG/1; MG/1
1 TABLET ORAL EVERY 4 HOURS PRN
Qty: 10 TABLET | Refills: 0 | Status: SHIPPED | OUTPATIENT
Start: 2021-10-12 | End: 2021-10-15

## 2021-10-12 RX ORDER — LABETALOL HYDROCHLORIDE 5 MG/ML
5 INJECTION, SOLUTION INTRAVENOUS
Status: DISCONTINUED | OUTPATIENT
Start: 2021-10-12 | End: 2021-10-12 | Stop reason: HOSPADM

## 2021-10-12 RX ORDER — COCAINE HYDROCHLORIDE 40 MG/ML
SOLUTION NASAL
Status: DISCONTINUED | OUTPATIENT
Start: 2021-10-12 | End: 2021-10-12 | Stop reason: HOSPADM

## 2021-10-12 RX ORDER — OXYCODONE HCL 5 MG/5 ML
10 SOLUTION, ORAL ORAL
Status: COMPLETED | OUTPATIENT
Start: 2021-10-12 | End: 2021-10-12

## 2021-10-12 RX ORDER — GLYCOPYRROLATE 0.2 MG/ML
INJECTION INTRAMUSCULAR; INTRAVENOUS PRN
Status: DISCONTINUED | OUTPATIENT
Start: 2021-10-12 | End: 2021-10-12 | Stop reason: SURG

## 2021-10-12 RX ORDER — OXYCODONE HCL 5 MG/5 ML
5 SOLUTION, ORAL ORAL
Status: COMPLETED | OUTPATIENT
Start: 2021-10-12 | End: 2021-10-12

## 2021-10-12 RX ORDER — METOPROLOL TARTRATE 1 MG/ML
1 INJECTION, SOLUTION INTRAVENOUS
Status: DISCONTINUED | OUTPATIENT
Start: 2021-10-12 | End: 2021-10-12 | Stop reason: HOSPADM

## 2021-10-12 RX ORDER — CELECOXIB 200 MG/1
200 CAPSULE ORAL ONCE
Status: COMPLETED | OUTPATIENT
Start: 2021-10-12 | End: 2021-10-12

## 2021-10-12 RX ORDER — HYDROMORPHONE HYDROCHLORIDE 2 MG/ML
INJECTION, SOLUTION INTRAMUSCULAR; INTRAVENOUS; SUBCUTANEOUS PRN
Status: DISCONTINUED | OUTPATIENT
Start: 2021-10-12 | End: 2021-10-12 | Stop reason: SURG

## 2021-10-12 RX ORDER — ONDANSETRON 2 MG/ML
4 INJECTION INTRAMUSCULAR; INTRAVENOUS
Status: DISCONTINUED | OUTPATIENT
Start: 2021-10-12 | End: 2021-10-12 | Stop reason: HOSPADM

## 2021-10-12 RX ORDER — HYDROMORPHONE HYDROCHLORIDE 1 MG/ML
0.2 INJECTION, SOLUTION INTRAMUSCULAR; INTRAVENOUS; SUBCUTANEOUS
Status: DISCONTINUED | OUTPATIENT
Start: 2021-10-12 | End: 2021-10-12 | Stop reason: HOSPADM

## 2021-10-12 RX ORDER — CEFAZOLIN SODIUM 1 G/3ML
INJECTION, POWDER, FOR SOLUTION INTRAMUSCULAR; INTRAVENOUS PRN
Status: DISCONTINUED | OUTPATIENT
Start: 2021-10-12 | End: 2021-10-12 | Stop reason: SURG

## 2021-10-12 RX ORDER — OXYMETAZOLINE HYDROCHLORIDE 0.05 G/100ML
SPRAY NASAL
Status: DISCONTINUED
Start: 2021-10-12 | End: 2021-10-12 | Stop reason: HOSPADM

## 2021-10-12 RX ORDER — ONDANSETRON 2 MG/ML
INJECTION INTRAMUSCULAR; INTRAVENOUS PRN
Status: DISCONTINUED | OUTPATIENT
Start: 2021-10-12 | End: 2021-10-12 | Stop reason: SURG

## 2021-10-12 RX ORDER — PHENYLEPHRINE HCL IN 0.9% NACL 0.5 MG/5ML
SYRINGE (ML) INTRAVENOUS PRN
Status: DISCONTINUED | OUTPATIENT
Start: 2021-10-12 | End: 2021-10-12 | Stop reason: SURG

## 2021-10-12 RX ORDER — LIDOCAINE HYDROCHLORIDE 20 MG/ML
INJECTION, SOLUTION EPIDURAL; INFILTRATION; INTRACAUDAL; PERINEURAL PRN
Status: DISCONTINUED | OUTPATIENT
Start: 2021-10-12 | End: 2021-10-12 | Stop reason: SURG

## 2021-10-12 RX ORDER — LIDOCAINE HYDROCHLORIDE AND EPINEPHRINE 10; 10 MG/ML; UG/ML
INJECTION, SOLUTION INFILTRATION; PERINEURAL
Status: DISCONTINUED | OUTPATIENT
Start: 2021-10-12 | End: 2021-10-12 | Stop reason: HOSPADM

## 2021-10-12 RX ORDER — MIDAZOLAM HYDROCHLORIDE 1 MG/ML
1 INJECTION INTRAMUSCULAR; INTRAVENOUS
Status: DISCONTINUED | OUTPATIENT
Start: 2021-10-12 | End: 2021-10-12 | Stop reason: HOSPADM

## 2021-10-12 RX ORDER — SODIUM CHLORIDE, SODIUM LACTATE, POTASSIUM CHLORIDE, CALCIUM CHLORIDE 600; 310; 30; 20 MG/100ML; MG/100ML; MG/100ML; MG/100ML
INJECTION, SOLUTION INTRAVENOUS CONTINUOUS
Status: ACTIVE | OUTPATIENT
Start: 2021-10-12 | End: 2021-10-12

## 2021-10-12 RX ADMIN — Medication 100 MCG: at 15:09

## 2021-10-12 RX ADMIN — FENTANYL CITRATE 100 MCG: 50 INJECTION, SOLUTION INTRAMUSCULAR; INTRAVENOUS at 14:44

## 2021-10-12 RX ADMIN — NEOSTIGMINE METHYLSULFATE 4 MG: 1 INJECTION INTRAVENOUS at 16:36

## 2021-10-12 RX ADMIN — Medication 100 MCG: at 15:07

## 2021-10-12 RX ADMIN — Medication 100 MCG: at 15:38

## 2021-10-12 RX ADMIN — ROCURONIUM BROMIDE 5 MG: 10 INJECTION, SOLUTION INTRAVENOUS at 15:26

## 2021-10-12 RX ADMIN — CEFAZOLIN 2 G: 330 INJECTION, POWDER, FOR SOLUTION INTRAMUSCULAR; INTRAVENOUS at 14:54

## 2021-10-12 RX ADMIN — MIDAZOLAM HYDROCHLORIDE 2 MG: 1 INJECTION, SOLUTION INTRAMUSCULAR; INTRAVENOUS at 14:43

## 2021-10-12 RX ADMIN — SODIUM CHLORIDE, POTASSIUM CHLORIDE, SODIUM LACTATE AND CALCIUM CHLORIDE: 600; 310; 30; 20 INJECTION, SOLUTION INTRAVENOUS at 15:29

## 2021-10-12 RX ADMIN — DEXAMETHASONE SODIUM PHOSPHATE 8 MG: 4 INJECTION, SOLUTION INTRA-ARTICULAR; INTRALESIONAL; INTRAMUSCULAR; INTRAVENOUS; SOFT TISSUE at 14:54

## 2021-10-12 RX ADMIN — HYDROMORPHONE HYDROCHLORIDE 0.2 MG: 2 INJECTION, SOLUTION INTRAMUSCULAR; INTRAVENOUS; SUBCUTANEOUS at 15:25

## 2021-10-12 RX ADMIN — GLYCOPYRROLATE 0.3 MG: 0.2 INJECTION INTRAMUSCULAR; INTRAVENOUS at 14:44

## 2021-10-12 RX ADMIN — Medication 100 MCG: at 15:21

## 2021-10-12 RX ADMIN — HYDROMORPHONE HYDROCHLORIDE 0.2 MG: 2 INJECTION, SOLUTION INTRAMUSCULAR; INTRAVENOUS; SUBCUTANEOUS at 15:18

## 2021-10-12 RX ADMIN — SODIUM CHLORIDE, POTASSIUM CHLORIDE, SODIUM LACTATE AND CALCIUM CHLORIDE: 600; 310; 30; 20 INJECTION, SOLUTION INTRAVENOUS at 13:38

## 2021-10-12 RX ADMIN — FENTANYL CITRATE 25 MCG: 50 INJECTION, SOLUTION INTRAMUSCULAR; INTRAVENOUS at 15:56

## 2021-10-12 RX ADMIN — ROCURONIUM BROMIDE 5 MG: 10 INJECTION, SOLUTION INTRAVENOUS at 15:47

## 2021-10-12 RX ADMIN — OXYCODONE HYDROCHLORIDE 10 MG: 5 SOLUTION ORAL at 17:07

## 2021-10-12 RX ADMIN — HYDROMORPHONE HYDROCHLORIDE 0.2 MG: 2 INJECTION, SOLUTION INTRAMUSCULAR; INTRAVENOUS; SUBCUTANEOUS at 15:14

## 2021-10-12 RX ADMIN — GLYCOPYRROLATE 0.8 MG: 0.2 INJECTION INTRAMUSCULAR; INTRAVENOUS at 16:36

## 2021-10-12 RX ADMIN — HYDROMORPHONE HYDROCHLORIDE 0.4 MG: 1 INJECTION, SOLUTION INTRAMUSCULAR; INTRAVENOUS; SUBCUTANEOUS at 17:07

## 2021-10-12 RX ADMIN — ACETAMINOPHEN 1000 MG: 500 TABLET ORAL at 13:39

## 2021-10-12 RX ADMIN — ROCURONIUM BROMIDE 5 MG: 10 INJECTION, SOLUTION INTRAVENOUS at 15:38

## 2021-10-12 RX ADMIN — CELECOXIB 200 MG: 200 CAPSULE ORAL at 13:39

## 2021-10-12 RX ADMIN — ROCURONIUM BROMIDE 45 MG: 10 INJECTION, SOLUTION INTRAVENOUS at 14:48

## 2021-10-12 RX ADMIN — ROCURONIUM BROMIDE 5 MG: 10 INJECTION, SOLUTION INTRAVENOUS at 15:18

## 2021-10-12 RX ADMIN — ONDANSETRON 4 MG: 2 INJECTION INTRAMUSCULAR; INTRAVENOUS at 14:58

## 2021-10-12 RX ADMIN — Medication 100 MCG: at 15:32

## 2021-10-12 RX ADMIN — Medication 100 MCG: at 15:16

## 2021-10-12 RX ADMIN — PROPOFOL 220 MG: 10 INJECTION, EMULSION INTRAVENOUS at 14:48

## 2021-10-12 RX ADMIN — OXYMETAZOLINE HYDROCHLORIDE 1 SPRAY: 0.05 SPRAY NASAL at 13:39

## 2021-10-12 RX ADMIN — LIDOCAINE HYDROCHLORIDE 85 MG: 20 INJECTION, SOLUTION EPIDURAL; INFILTRATION; INTRACAUDAL at 14:48

## 2021-10-12 ASSESSMENT — PAIN DESCRIPTION - PAIN TYPE
TYPE: SURGICAL PAIN

## 2021-10-12 NOTE — OR SURGEON
Immediate Post OP Note    PreOp Diagnosis: nasal obstruction, septal deviation, turb hypertrophy, nasal valve collapse      PostOp Diagnosis: Same      Procedure(s):  SEPTOPLASTY, NOSE   REDUCTION, NASAL TURBINATE - INFERIOR  NASAL VALVE REPAIR     Surgeon(s):  Prakash Torrez M.D.    Anesthesiologist/Type of Anesthesia:  Anesthesiologist: Abdelrahman Hooks M.D./General    Surgical Staff:  Circulator: Livier Cantrell R.N.  Scrub Person: Afua Fall; George Marte    Specimens removed if any:  * No specimens in log *    Estimated Blood Loss: 30CC    Findings: Narrow middle vault    Complications: None    #[id number]    10/12/2021 4:38 PM Prakash Torrez M.D.

## 2021-10-12 NOTE — ANESTHESIA POSTPROCEDURE EVALUATION
Patient: Leroy Thao    Procedure Summary     Date: 10/12/21 Room / Location: [facility] ROOM 23 / SURGERY SAME DAY [facility]    Anesthesia Start: 1443 Anesthesia Stop: 1648    Procedures:       SEPTOPLASTY, NOSE (N/A Nose)      REDUCTION, NASAL TURBINATE - INFERIOR (Bilateral Nose) Diagnosis: (NASAL OBSTRUCTION, DEVIATED SEPTUM, TURBINATE HYPERTROPHY, NASAL VALVE COLLAPSE)    Surgeons: Prakash Torrez M.D. Responsible Provider: Abdelrahman Hooks M.D.    Anesthesia Type: general ASA Status: 2          Final Anesthesia Type: general  Last vitals  BP   Blood Pressure: 115/59    Temp   36.9 °C (98.4 °F)    Pulse   64   Resp   18    SpO2   94 %      Anesthesia Post Evaluation    Patient location during evaluation: PACU  Patient participation: complete - patient participated  Level of consciousness: awake and alert    Airway patency: patent  Anesthetic complications: no  Cardiovascular status: hemodynamically stable  Respiratory status: acceptable  Hydration status: euvolemic    PONV: none          No complications documented.     Nurse Pain Score: 0 (NPRS)

## 2021-10-12 NOTE — ANESTHESIA PROCEDURE NOTES
Airway    Date/Time: 10/12/2021 2:49 PM  Performed by: Abdelrahman Hooks M.D.  Authorized by: Abdelrahman Hooks M.D.     Location:  OR  Urgency:  Elective  Indications for Airway Management:  Anesthesia      Spontaneous Ventilation: absent    Sedation Level:  Deep  Preoxygenated: Yes    Patient Position:  Sniffing  Final Airway Type:  Endotracheal airway  Final Endotracheal Airway:  ETT  Cuffed: Yes    Technique Used for Successful ETT Placement:  Direct laryngoscopy    Insertion Site:  Oral  Blade Type:  Taisha  Laryngoscope Blade/Videolaryngoscope Blade Size:  4  ETT Size (mm):  7.5  Measured from:  Lips  ETT to Lips (cm):  23  Placement Verified by: auscultation and capnometry    Cormack-Lehane Classification:  Grade I - full view of glottis  Number of Attempts at Approach:  1

## 2021-10-12 NOTE — OR NURSING
1644: patient arrived from OR, report received, attached to monitoring. VSS, patient oxygenating well on 4 L oxymask, dressing to nose: hess splints with bacitracin (internal), thermoplast splint, mastisol: clean/dry/intact    1653: patient drinking water tolerating well       1707: patient c/o 7/10 pain, IV Dilaudid 0.4 mg given and PO Oxy 10 mg given    1715: phase I recovery complete, patient eating and drinking, vss, pain and nausea manageable     1716: telephone updates to patient's wife Nishi    1730: discharge instructions given to patient's wife, Nishi, questions answered    1745: piv d/c tip intact    1800: patient d/c home in stable condition, vss, pain tolerable, denies nausea, all belongings with patient and

## 2021-10-12 NOTE — ANESTHESIA PREPROCEDURE EVALUATION
32yo     P Med Hx:  Arthritis  Back problems    P Surg Hx:  Knee Arthroscopy  ACL  L5-S1 microdiscectomy    Non-smoker    Relevant Problems   No relevant active problems       Physical Exam    Airway   Mallampati: II  TM distance: >3 FB  Neck ROM: full       Cardiovascular - normal exam  Rhythm: regular  Rate: normal  (-) murmur     Dental - normal exam           Pulmonary - normal exam  Breath sounds clear to auscultation     Abdominal    Neurological - normal exam                 Anesthesia Plan    ASA 2       Plan - general       Airway plan will be ETT          Induction: intravenous    Postoperative Plan: Postoperative administration of opioids is intended.    Pertinent diagnostic labs and testing reviewed    Informed Consent:    Anesthetic plan and risks discussed with patient.    Use of blood products discussed with: patient whom consented to blood products.

## 2021-10-13 NOTE — DISCHARGE INSTRUCTIONS
After Rhinoplasty Surgery    The Day of Surgery  Immediately following the surgery, you will wake up in the recovery room and will be quite groggy, this is very normal.  If you are staying in the hospital you will be brought to your room after resting in the immediate recovery room.  If you are leaving following your surgery, then you will be brought to the short-stay recovery room to relax until you are  feeling well enough to leave with your escort. During your recovery if you feel ill or nauseous alert a nurse who can  make you feel better with some medication or special care. You will be given some medication for pain and an antibiotic  before you leave to ensure you are comfortable for your journey home. Prescriptions for some pain medication and  antibiotics will be given to you or your escort prior to being discharged from the hospital.    At home  The first days following surgery you are newly swollen, bruised and sore-it is to be expected. During the first 24 hours  after surgery ice your eyes as much as possible with ice water soaked gauze pads or small amounts of frozen peas or  blueberries in zip-locked bags. This will help reduce some of the swelling around the nose and eyes.  It is common to experience some draining of mucus or blood in your throat after surgery--this is from the incisions in  your nose. DO NOT blow your nose during the first week post-op. Instead, use nasal saline spray to gently irrigate (clean  out) the nose a few times a day. Your nose will be cleaned out at your first post-operative appointment. If you have any  scabs or crusting, do not pick at it. You can gently use a Q-tip and peroxide to moisten any scabs so that they fall off. It is  important to keep ointment (either bacitracin or aquaphor) on any incisions to prevent scabs from forming.  Bruising and swelling usually peek 2-3 days following surgery and will subside about 7-10 days later. Some swelling may  be evident  for several weeks after surgery; however this amount will be quite small and not noticeable to others.  Tightness and lumpiness around the eyes is common and simply takes time to settle.  Following surgery patients experience difficulty breathing through their nose due to its stuffiness and post-operative  pressure. You will need to resort to breathing through your mouth for one week. Using Chapstick and drinking plenty of  water will help you avoid chapped lips and keep your mouth comfortable during this time. You can gently clean the area  with nasal spray and Q-Tips but again, do not blow your nose to clear this--that may result in disrupting the stitches and  incision area.    You may experience light bleeding in or around the incision areas. This is normal and not worrisome. However, it you  find any hard, large collections of blood or excessive bleeding please alert our office as soon as possible. For urgent  issues after hours, call our office at [phone].  Follow your medication instructions carefully and consult your PCP regarding resuming any discontinued medications  before doing so. Be sure to use your pain medication as instructed and substitute Extra-Strength Tylenol for your  prescription pain medication as soon as you are comfortable doing so. Pain medication is generally not needed after one  week.  Lack of feeling or partial feeling in the nose and midface is normal and will gradually improve with time.    CONTACT US IMMEDIATELY IF YOU NOTICE THE FOLLOWING:  • Fever above 100 degrees F  • Unusual swelling, redness, or discharge  • Excessive pain not relieved with oral medication  During office hours call [phone] for questions or concerns.    Keeping It Clean  Once a day the incisions should be carefully cleaned using a gentle cleanser, such as Cetaphil, and apply Aquaphor to  keep the area clean and moist--this will encourage the incisions to heal and the stitches to dissolve faster.  Remember,  never to pull away from the incisions. Q-Tips and saline nasal drops are helpful to carefully loosen uncomfortable  material in the nose and keep the area moist. Stubborn areas to clean can be remedied with a mixture of water with a  small amount of hydrogen peroxide or a diluted hydrogen peroxide pad. Please, be gentle!  You may shower 24 hours following surgery however, avoid hot baths and do not soak your incision areas. DO NOT get  your splint wet. If necessary, use some plastic (from a plastic bag) and tape to cover the splint while showering. A mild  shampoo like [employer]; Berry’s baby shampoo is best. Do not bend over to wash your hair, instead, hold your head  back in the shower. Be gentle with your face--especially when toweling dry.    Do’s and Don’ts  During the first week after surgery you should rest at home/hotel as much as possible. You also should not be moving  your head around or bending down at the waist for the first few days after surgery--this is to minimize rushing blood to  the area. Instead, bend at the knees and/or turn your entire body if necessary. Keep your head elevated at all times.  When sleeping, use two pillows to prop up your head and support your neck. It is not necessary to sleep at a 90° angle but  keeping your head above your heart is best. Do not sleep on your side for one week and if necessary, place pillows on the  side your face to prohibit yourself from turning your face while sleeping.  Short walks a few days following surgery are encouraged but please have an escort with you the first couple of trips.  However, any physical activity that raises your blood pressure or causes sweating is prohibited for one week. Soft foods  like fruit shakes, yogurt, soup and scrambled eggs are good choices following surgery since they are easy to prepare and  eat.  A first post-operative appointment is scheduled 5-7 days following surgery. At this time, Dr. Torrez will  review your  progress, answer any questions and your splint and sutures will be removed.  Avoid sun exposure to the surgical area for six weeks and use sunscreen on incisions for at least one year. This will help  improve the quality of the scar and help it fade.  After one or two weeks most patients feel they look presentable and return back to work around this time. Traveling is  permitted after one week. However, it is important to have help carrying any heavy luggage.  After two weeks you may resume: sleeping on your side, driving, sedentary work and light exercise like slow walking or  using an exercise bike. Intense exercise (running, yoga, pilates, elliptical) can be resumed 4 weeks following surgery.    Skin Care  You may wash your face gently following surgery however; moisturizing or makeup application is strongly discouraged.  After one week, you may apply makeup to camouflage any bruising if necessary. Full Cover by Make Up For Ever  (available at [employer]) is an excellent full coverage concealer. Do not apply makeup to incision areas for 10 days. If  you’d like, you can apply ointment, like Aquaphor, to the incision areas.  If you are going outside, you should use a sunscreen with an SPF of 15 or higher. A higher SPF should be used on your  scars to help reduce their visibility as much as possible. Electrolysis, laser treatments, facials or any other skin treatments  should be avoided for three months. Products containing Retin-A, Renova, Vitamin E, glycolic or alpha-hydroxy acid  should be avoided for three months. Cetaphil, Brisa Santiago and Sultana Cooney all offer gentle cleansers, moisturizers,  camouflage and sunscreens ideal for post-operative care.    Most importantly, remember to keep your confidence up.  Recovery takes time but you are going to heal beautifully!          ACTIVITY: Rest and take it easy for the first 24 hours.  A responsible adult is recommended to remain with you during that time.   It is normal to feel sleepy.  We encourage you to not do anything that requires balance, judgment or coordination.    MILD FLU-LIKE SYMPTOMS ARE NORMAL. YOU MAY EXPERIENCE GENERALIZED MUSCLE ACHES, THROAT IRRITATION, HEADACHE AND/OR SOME NAUSEA.    FOR 24 HOURS DO NOT:  Drive, operate machinery or run household appliances.  Drink beer or alcoholic beverages.   Make important decisions or sign legal documents.        DIET: To avoid nausea, slowly advance diet as tolerated, avoiding spicy or greasy foods for the first day.  Add more substantial food to your diet according to your physician's instructions. INCREASE FLUIDS AND FIBER TO AVOID CONSTIPATION.      FOLLOW-UP APPOINTMENT:  A follow-up appointment should be arranged with your doctor; call to schedule.    You should CALL YOUR PHYSICIAN if you develop:  Fever greater than 101 degrees F.  Pain not relieved by medication, or persistent nausea or vomiting.  Excessive bleeding (blood soaking through dressing) or unexpected drainage from the wound.  Extreme redness or swelling around the incision site, drainage of pus or foul smelling drainage.  Inability to urinate or empty your bladder within 8 hours.      You should call 911 if you develop problems with breathing or chest pain.      If any questions arise, call your doctor.  If your doctor is not available, please feel free to call the Surgical Center at [phone]. The Contact Center is open Monday through Friday 7AM to 5PM and may speak to a nurse at [phone], or toll free at [phone].     A registered nurse may call you a few days after your surgery to see how you are doing after your procedure.    MEDICATIONS: Resume taking daily medication.  Take prescribed pain medication with food.  If no medication is prescribed, you may take non-aspirin pain medication if needed.  PAIN MEDICATION CAN BE VERY CONSTIPATING.  Take a stool softener or laxative such as senokot, pericolace, or milk of magnesia if  needed.    Prescription given for Norco.  Last pain medication given at Tylenol and Celebrex at 1:30PM, Oxycodone 10 mg at 5PM.    If your physician has prescribed pain medication that includes Acetaminophen (Tylenol), do not take additional Acetaminophen (Tylenol) while taking the prescribed medication.    Depression / Suicide Risk    As you are discharged from this [facility], it is important to learn how to keep safe from harming yourself.    Recognize the warning signs:  · Abrupt changes in personality, positive or negative- including increase in energy   · Giving away possessions  · Change in eating patterns- significant weight changes-  positive or negative  · Change in sleeping patterns- unable to sleep or sleeping all the time   · Unwillingness or inability to communicate  · Depression  · Unusual sadness, discouragement and loneliness  · Talk of wanting to die  · Neglect of personal appearance   · Rebelliousness- reckless behavior  · Withdrawal from people/activities they love  · Confusion- inability to concentrate     If you or a loved one observes any of these behaviors or has concerns about self-harm, here's what you can do:  · Talk about it- your feelings and reasons for harming yourself  · Remove any means that you might use to hurt yourself (examples: pills, rope, extension cords, firearm)  · Get professional help from the community (Mental Health, Substance Abuse, psychological counseling)  · Do not be alone:Call your Safe Contact- someone whom you trust who will be there for you.  · Call your local CRISIS HOTLINE 035-5236 or 722-724-2545  · Call your local Children's Mobile Crisis Response Team [place] (945) 379-6994 or www.LMN-1  · Call the toll free National Suicide Prevention Hotlines   · National Suicide Prevention Lifeline 286-744-NGGZ (6942)  · National Hope Line Network 800-SUICIDE (934-0857)

## 2021-10-13 NOTE — OP REPORT
DATE OF SERVICE:  10/12/2021     PREOPERATIVE DIAGNOSES:  1.  Nasal obstruction.  2.  Nasal septal deviation.  3.  Inferior turbinate hypertrophy.  4.  Nasal valve collapse.     POSTOPERATIVE DIAGNOSES:  1.  Nasal obstruction.  2.  Nasal septal deviation.  3.  Inferior turbinate hypertrophy.  4.  Nasal valve collapse.     PROCEDURES PERFORMED:  1.  Septoplasty.  2.  Nasal valve repair using  grafts.  3.  Bilateral inferior turbinate reduction.     INDICATIONS FOR PROCEDURE:  The patient is a very pleasant 36-year-old male   who has a long time history of nasal obstruction.  He tried nasal steroid   sprays for over one month without significant improvement.  He did get minimal   benefit with nasal steroids, but he had internal nasal valve collapse that improved with   caudal and modified caudal maneuvers.  Risks, benefits and alternatives to the   aforementioned procedure were discussed with the patient, who gave his   informed consent.     OPERATIVE FINDINGS:  Bilateral inferior turbinate hypertrophy and narrow   middle valve.     ANESTHESIOLOGIST:  Abdelrahman Hooks MD     ANESTHESIA:  General endotracheal.     PROCEDURE IN DETAIL:  The patient was identified in the preoperative holding   area and brought to the operating room.  He was intubated without difficulty   and a timeout was satisfactorily completed.  A 1% lidocaine with 1:50,000   epinephrine was injected into the nasal septum and the nasal tip.  An inverted   V incision had been drawn and the nose was prepped and draped in usual clean   fashion.  Medial marginal incisions were made with a converse scissor and the   inverted V incision was made with a 15 blade scalpel.  The nasal tip was   elevated and the paired columellar arteries were bipolared.  Three point   retraction was used to expose the nasal tip and blunt dissection was performed   up on top of the nasal bones.  Lateral marginal incisions had been made with   a converse scissor.  The septum  was exposed and submucoperichondrial flaps   were elevated bilaterally.  A piece of septal cartilage was removed, taking   care to remove the deflected septal cartilage.  Two  grafts were   fashioned using the removed septal cartilage and sewn with 5-0 PDS suture.    The upper lateral cartilages were closed over the  graft with the same   suture.  After the septum was adequately straightened and the  grafts   were secured, attention was turned to the inferior turbinates.  Using a   0-degree endoscope, 1% lidocaine with 1:100,000 epinephrine was injected into   each inferior turbinate and a stab incision was made with a 15 blade scalpel.    Submucosal pockets were created with a caudal elevator and a 2.0 Medtronic   microdebrider was used to submucosally resect the tissues from the inferior   turbinates.  These were then outfractured using a Boies elevator.  The nasal   cavities were inspected and suctioned of blood.  Meyer splints were covered in   bacitracin and placed into the nasal cavities and secured with one horizontal   mattress 2-0 silk suture.  This was performed after the inverted V incision   was closed with 7 simple interrupted 7-0 Vicryl sutures.  A thermoplastic   nasal splint was placed and the patient was awoken and brought to the   postanesthesia care unit in good condition.     ESTIMATED BLOOD LOSS:  30 mL.     COMPLICATIONS  None.     SPECIMENS REMOVED:  None.     IMPLANTS:  None.        ______________________________  Prakash FERRARA/NURIA/ISAEL/ISAEL    DD:  10/12/2021 16:44  DT:  10/12/2021 17:28    Job#:  [id number]

## 2024-05-29 ENCOUNTER — APPOINTMENT (RX ONLY)
Dept: URBAN - METROPOLITAN AREA CLINIC 6 | Facility: CLINIC | Age: 39
Setting detail: DERMATOLOGY
End: 2024-05-29

## 2024-05-29 DIAGNOSIS — B35.8 OTHER DERMATOPHYTOSES: ICD-10-CM

## 2024-05-29 PROCEDURE — ? PRESCRIPTION MEDICATION MANAGEMENT

## 2024-05-29 PROCEDURE — 99203 OFFICE O/P NEW LOW 30 MIN: CPT

## 2024-05-29 PROCEDURE — ? PRESCRIPTION

## 2024-05-29 PROCEDURE — ? COUNSELING

## 2024-05-29 RX ORDER — TRIAMCINOLONE ACETONIDE 1 MG/G
OINTMENT TOPICAL
Qty: 30 | Refills: 1 | Status: ERX | COMMUNITY
Start: 2024-05-29

## 2024-05-29 RX ORDER — TERBINAFINE HYDROCHLORIDE 250 MG/1
TABLET ORAL
Qty: 21 | Refills: 0 | Status: ERX | COMMUNITY
Start: 2024-05-29

## 2024-05-29 RX ADMIN — TRIAMCINOLONE ACETONIDE: 1 OINTMENT TOPICAL at 00:00

## 2024-05-29 RX ADMIN — TERBINAFINE HYDROCHLORIDE: 250 TABLET ORAL at 00:00

## 2024-05-29 ASSESSMENT — LOCATION DETAILED DESCRIPTION DERM: LOCATION DETAILED: LEFT DORSAL MIDDLE METACARPOPHALANGEAL JOINT

## 2024-05-29 ASSESSMENT — LOCATION SIMPLE DESCRIPTION DERM: LOCATION SIMPLE: LEFT HAND

## 2024-05-29 ASSESSMENT — LOCATION ZONE DERM: LOCATION ZONE: HAND

## 2024-05-29 NOTE — PROCEDURE: PRESCRIPTION MEDICATION MANAGEMENT
Render In Strict Bullet Format?: No
Detail Level: Zone
Initiate Treatment: Terbinafine 250mg QD x 3 weeks and Triamcinolone 0.1% ointment QD x 2 weeks.

## 2024-08-02 ENCOUNTER — OFFICE VISIT (OUTPATIENT)
Dept: URGENT CARE | Facility: CLINIC | Age: 39
End: 2024-08-02
Payer: COMMERCIAL

## 2024-08-02 ENCOUNTER — APPOINTMENT (OUTPATIENT)
Dept: URGENT CARE | Facility: CLINIC | Age: 39
End: 2024-08-02
Payer: COMMERCIAL

## 2024-08-02 VITALS
SYSTOLIC BLOOD PRESSURE: 118 MMHG | OXYGEN SATURATION: 98 % | BODY MASS INDEX: 26.08 KG/M2 | DIASTOLIC BLOOD PRESSURE: 74 MMHG | HEIGHT: 73 IN | RESPIRATION RATE: 16 BRPM | WEIGHT: 196.8 LBS | TEMPERATURE: 96.9 F | HEART RATE: 60 BPM

## 2024-08-02 DIAGNOSIS — R22.2 SUBCUTANEOUS MASS OF ABDOMINAL WALL: ICD-10-CM

## 2024-08-02 ASSESSMENT — ENCOUNTER SYMPTOMS
ABDOMINAL PAIN: 1
FEVER: 0
NAUSEA: 0
VOMITING: 0

## 2024-08-02 NOTE — PROGRESS NOTES
Subjective:     Leroy Thao is a 39 y.o. male who presents for Abdominal Pain (X2.5wks, upper right abdominal bump, painful)      Patient presents for a bump to his RUQ abdomen. States he noticed it 2.5 weeks ago. Denies an injury. States he does experience some pain in the area with certain movements, that radiates towards his lower abdomen. Is tender to touch. Has felt constipated x 3-4 days, stating he's still having bowl movement. No urinary symptoms. No testicular pain. No history of hernia.     Abdominal Pain  This is a new problem. The current episode started 1 to 4 weeks ago. Pertinent negatives include no dysuria, fever, nausea or vomiting.       Past Medical History:   Diagnosis Date    History of stem cell transplant (HCC)     for knee and wrist    Osteoarthritis 09/26/2018    Left Wrist & Knee       Past Surgical History:   Procedure Laterality Date    [place] REPAIR OF NASAL SEPTUM N/A 10/12/2021    Procedure: SEPTOPLASTY, NOSE;  Surgeon: Prakash Torrez M.D.;  Location: SURGERY SAME DAY [facility];  Service: Ent    TURBINATE REDUCTION Bilateral 10/12/2021    Procedure: REDUCTION, NASAL TURBINATE - INFERIOR;  Surgeon: Prakash Torrez M.D.;  Location: SURGERY SAME [facility];  Service: Ent    LUMBAR LAMINECTOMY DISKECTOMY Left 10/3/2018    Procedure: LUMBAR LAMINECTOMY DISKECTOMY - L5-S1 MICRODISKECTOMY;  Surgeon: Cristóbal Huerta M.D.;  Location: SURGERY [facility];  Service: Neurosurgery    MENISCUS REPAIR Left 2016    MENISCUS REPAIR Left 2011    REPAIR, KNEE, ACL Left 2009    OTHER      Torn Ligaments Left Wrist 2013       Social History     Socioeconomic History    Marital status:      Spouse name: Not on file    Number of children: Not on file    Years of education: Not on file    Highest education level: Not on file   Occupational History    Not on file   Tobacco Use    Smoking status: Never    Smokeless tobacco: Never   Vaping Use    Vaping status: Never Used  "  Substance and Sexual Activity    Alcohol use: Yes     Comment: 1-2/week    Drug use: No    Sexual activity: Not on file   Other Topics Concern    Not on file   Social History Narrative    Not on file     Social Determinants of Health     Financial Resource Strain: Not on file   Food Insecurity: Not on file   Transportation Needs: Not on file   Physical Activity: Not on file   Stress: Not on file   Social Connections: Not on file   Intimate Partner Violence: Not on file   Housing Stability: Not on file        History reviewed. No pertinent family history.     No Known Allergies    Review of Systems   Constitutional:  Negative for fever.   Gastrointestinal:  Positive for abdominal pain. Negative for nausea and vomiting.   Genitourinary:  Negative for dysuria.   All other systems reviewed and are negative.       Objective:   /74 (BP Location: Left arm, Patient Position: Sitting, BP Cuff Size: Adult)   Pulse 60   Temp 36.1 °C (96.9 °F) (Temporal)   Resp 16   Ht 1.854 m (6' 1\")   Wt 89.3 kg (196 lb 12.8 oz)   SpO2 98%   BMI 25.96 kg/m²     Physical Exam  Vitals reviewed.   Constitutional:       General: He is not in acute distress.     Appearance: He is not toxic-appearing.   Cardiovascular:      Rate and Rhythm: Normal rate.   Pulmonary:      Effort: Pulmonary effort is normal. No respiratory distress.   Abdominal:      General: Abdomen is flat. Bowel sounds are normal.      Palpations: Abdomen is soft. There is mass.      Tenderness: There is abdominal tenderness. There is no guarding.          Comments: 1.5 cm, firm palpable mass to RUQ. No erythema, bruising, heat, or skin breakdown.   Skin:     General: Skin is warm and dry.      Findings: No bruising or erythema.   Neurological:      Mental Status: He is alert and oriented to person, place, and time.         Assessment/Plan:   1. Subcutaneous mass of abdominal wall  - US-ABDOMEN LTD (SOFT TISSUE); Future  - Referral to Intake Oncology " Coordinator    Follow up emergently if area becomes increasingly more painful, discolored, hot or red, difficulty with bowel movements, vomiting, tachycardia, skin breakdown, signs of infection, or fever.     Abdominal mass palpated and to the RUQ, low suspicion for a hernia. Discussed the mass appears to be within the soft tissue, differential to include a cyst. Area is tender to palpation. No signs of cellulitis. Patient is to follow up on imaging.     US-ABDOMEN LTD (SOFT TISSUE)    Result Date: 8/4/2024 8/4/2024 3:46 PM HISTORY/REASON FOR EXAM:  Abdominal mass RUQ, low suspicion for a hernia. TECHNIQUE/EXAM DESCRIPTION: Limited abdominal ultrasound. COMPARISON: None available. FINDINGS: There is a subcutaneous mass In the right upper quadrant of the abdomen. It is vascularized and therefore suspicious     Solid vascularized subcutaneous right upper quadrant abdominal wall mass. This could be inflammatory or neoplastic.    -Consulted with the Oncology Coordinator via [place] regarding the imaging findings, and was advised to place the referral. Discussed the imaging findings and follow up with the patient for further evaluation of the mass.     Differential diagnosis, natural history, supportive care, and indications for immediate follow-up discussed.

## 2024-08-03 NOTE — PATIENT INSTRUCTIONS
Follow up emergently if area becomes increasingly more painful, discolored, hot or red, difficulty with bowel movements, vomiting, tachycardia, skin breakdown, signs of infection, or fever.

## 2024-08-04 ENCOUNTER — TELEPHONE (OUTPATIENT)
Dept: URGENT CARE | Facility: PHYSICIAN GROUP | Age: 39
End: 2024-08-04

## 2024-08-04 ENCOUNTER — HOSPITAL ENCOUNTER (OUTPATIENT)
Dept: RADIOLOGY | Facility: MEDICAL CENTER | Age: 39
End: 2024-08-04
Attending: NURSE PRACTITIONER
Payer: COMMERCIAL

## 2024-08-04 DIAGNOSIS — R19.01 ABDOMINAL MASS, RIGHT UPPER QUADRANT: ICD-10-CM

## 2024-08-04 PROCEDURE — 76705 ECHO EXAM OF ABDOMEN: CPT

## 2024-08-05 NOTE — TELEPHONE ENCOUNTER
Consulted with the oncology coordinator regarding imaging findings. Awaiting follow up recommendations upon review of imaging tomorrow, referral was placed.     US-ABDOMEN LTD (SOFT TISSUE)    Result Date: 8/4/2024 8/4/2024 3:46 PM HISTORY/REASON FOR EXAM:  Abdominal mass RUQ, low suspicion for a hernia. TECHNIQUE/EXAM DESCRIPTION: Limited abdominal ultrasound. COMPARISON: None available. FINDINGS: There is a subcutaneous mass In the right upper quadrant of the abdomen. It is vascularized and therefore suspicious     Solid vascularized subcutaneous right upper quadrant abdominal wall mass. This could be inflammatory or neoplastic

## 2024-08-21 ENCOUNTER — HOSPITAL ENCOUNTER (OUTPATIENT)
Dept: HEMATOLOGY ONCOLOGY | Facility: MEDICAL CENTER | Age: 39
End: 2024-08-21
Attending: NURSE PRACTITIONER
Payer: COMMERCIAL

## 2024-08-21 ENCOUNTER — TELEPHONE (OUTPATIENT)
Dept: HEMATOLOGY ONCOLOGY | Facility: MEDICAL CENTER | Age: 39
End: 2024-08-21

## 2024-08-21 VITALS
SYSTOLIC BLOOD PRESSURE: 112 MMHG | HEIGHT: 73 IN | BODY MASS INDEX: 26.63 KG/M2 | WEIGHT: 200.95 LBS | RESPIRATION RATE: 16 BRPM | OXYGEN SATURATION: 97 % | TEMPERATURE: 96.6 F | HEART RATE: 58 BPM | DIASTOLIC BLOOD PRESSURE: 64 MMHG

## 2024-08-21 DIAGNOSIS — R19.01 RIGHT UPPER QUADRANT ABDOMINAL MASS: ICD-10-CM

## 2024-08-21 PROCEDURE — 99212 OFFICE O/P EST SF 10 MIN: CPT | Performed by: NURSE PRACTITIONER

## 2024-08-21 ASSESSMENT — ENCOUNTER SYMPTOMS
WEIGHT LOSS: 0
CONSTIPATION: 0
CHILLS: 0
DIAPHORESIS: 0
HEADACHES: 0
COUGH: 0
BLOOD IN STOOL: 0
SHORTNESS OF BREATH: 0
ABDOMINAL PAIN: 1
DIZZINESS: 0
PALPITATIONS: 0
NAUSEA: 0
VOMITING: 0
FEVER: 0
MYALGIAS: 0
WHEEZING: 0
DIARRHEA: 0

## 2024-08-21 NOTE — PROGRESS NOTES
Subjective     Leroy Thao is a 39 y.o. male who presents with New Patient (IOC/ Subcutaneous mass of abdominal wall / Jacey Conley)          HPI    Patient referred to me, Intake Oncology Coordinator by urgent care provider MARLON Manzo for abdominal mass.  Patient is unaccompanied for today's visit.    Patient stated approximately mid July 2024 he noticed a lump in the right upper quadrant of his abdomen.  To touch it is mild tender.  He did not think anything of it but it did not disappear.  After speaking to his family, he was encouraged to get it checked out by a healthcare provider.  He presented to the urgent care on 8/2/2024 and ultimately was sent for an ultrasound completed on 8/4/2024.  Ultrasound showed a subcutaneous mass in the right upper quadrant of the abdomen that was vascularized and therefore suspicious.  Radiology stated this was a solid subcutaneous mass in the abdominal wall which could be inflammatory or neoplastic.  On physical examination today was able to appreciate a 2 cm x 1 cm mass in that area.  It was hard to touch.  There was some tenderness to palpation in that area, otherwise no other pain or abnormality on physical exam.    Patient does state that it is still slightly tender especially if he pushes on it.  He is not certain if it has gotten any significantly bigger since he first noticed it but definitely not smaller.  His appetite has been stable, and his weight is stable.  He denies any nausea vomiting, diarrhea or constipation.  He denies any unplanned weight loss, significant fatigue or drenching night sweats.    See past medical and surgical history below.    Patient is a never smoker.    Patient does have a family history of cancer in his mother who had breast cancer and is alive and well.  Paternal grandfather with throat cancer.  Paternal aunt and paternal cousin both with breast cancer, alive and well.    No Known Allergies  No current outpatient medications on  file prior to encounter.     No current facility-administered medications on file prior to encounter.     .pmh  Past Surgical History:   Procedure Laterality Date    ID REPAIR OF NASAL SEPTUM N/A 10/12/2021    Procedure: SEPTOPLASTY, NOSE;  Surgeon: Prakash Torrez M.D.;  Location: SURGERY SAME DAY [facility];  Service: Ent    TURBINATE REDUCTION Bilateral 10/12/2021    Procedure: REDUCTION, NASAL TURBINATE - INFERIOR;  Surgeon: Prakash Torrez M.D.;  Location: SURGERY SAME DAY [facility];  Service: Ent    LUMBAR LAMINECTOMY DISKECTOMY Left 10/3/2018    Procedure: LUMBAR LAMINECTOMY DISKECTOMY - L5-S1 MICRODISKECTOMY;  Surgeon: Cristóbal Huerta M.D.;  Location: SURGERY [facility];  Service: Neurosurgery    MENISCUS REPAIR Left 2016    MENISCUS REPAIR Left 2011    REPAIR, KNEE, ACL Left 2009    OTHER      Torn Ligaments Left Wrist 2013     Family History   Problem Relation Age of Onset    Cancer Mother         Breast cancer    Cancer Paternal Aunt         Breast cancer    Cancer Paternal Grandfather         Throat cancer    Cancer Other         Breast cancer     Social History     Socioeconomic History    Marital status:    Tobacco Use    Smoking status: Never    Smokeless tobacco: Never   Vaping Use    Vaping status: Never Used   Substance and Sexual Activity    Alcohol use: Not Currently     Comment: Not in the last year    Drug use: No   Social History Narrative    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   Respiratory:  Negative for cough, shortness of breath and wheezing.    Cardiovascular:  Negative for chest pain and palpitations.   Gastrointestinal:  Positive for abdominal pain (mild in the RUQ). Negative for blood in stool, constipation, diarrhea, nausea and vomiting.   Genitourinary:  Negative for dysuria and hematuria.   Musculoskeletal:  Negative for myalgias.  "  Skin:  Negative for itching and rash.   Neurological:  Negative for dizziness and headaches.              Objective     /64 (BP Location: Right arm, Patient Position: Sitting, BP Cuff Size: Adult)   Pulse (!) 58   Temp 35.9 °C (96.6 °F) (Temporal)   Resp 16   Ht 1.854 m (6' 0.99\")   Wt 91.2 kg (200 lb 15.2 oz)   SpO2 97%   BMI 26.52 kg/m²      Physical Exam  Vitals reviewed.   Constitutional:       General: He is not in acute distress.     Appearance: Normal appearance. He is not diaphoretic.   HENT:      Head: Normocephalic and atraumatic.   Cardiovascular:      Rate and Rhythm: Normal rate and regular rhythm.      Heart sounds: Normal heart sounds. No murmur heard.     No friction rub. No gallop.   Pulmonary:      Effort: Pulmonary effort is normal. No respiratory distress.      Breath sounds: Normal breath sounds. No wheezing.   Abdominal:      General: Bowel sounds are normal. There is no distension.      Palpations: Abdomen is soft. There is mass (RUQ mass noted measuring 2 cm horizontally by 1 cm vertically).      Tenderness: There is no abdominal tenderness.   Musculoskeletal:         General: No swelling or tenderness. Normal range of motion.   Skin:     General: Skin is warm and dry.   Neurological:      Mental Status: He is alert and oriented to person, place, and time.   Psychiatric:         Mood and Affect: Mood normal.         Behavior: Behavior normal.               US-ABDOMEN LTD (SOFT TISSUE)    Result Date: 8/4/2024 8/4/2024 3:46 PM HISTORY/REASON FOR EXAM:  Abdominal mass RUQ, low suspicion for a hernia. TECHNIQUE/EXAM DESCRIPTION: Limited abdominal ultrasound. COMPARISON: None available. FINDINGS: There is a subcutaneous mass In the right upper quadrant of the abdomen. It is vascularized and therefore suspicious     Solid vascularized subcutaneous right upper quadrant abdominal wall mass. This could be inflammatory or neoplastic              Assessment & Plan     1. Right upper " quadrant abdominal mass  CT-ABDOMEN WITH & W/O          Assessment & Plan  Right upper quadrant abdominal mass    Orders:    CT-ABDOMEN WITH & W/O; Future       Patient with a solid vascularized subcutaneous mass in the abdominal wall in the right upper quadrant.  Ultrasound completed however do recommend moving forward with a CT abdomen with and without contrast for further evaluation and better visualization of this mass.  I discussed with patient that biopsy may be indicated, however will await CT results to determine best plan of care.  I will contact patient via phone with results once completed to discuss the next steps.  Patient did verbalize understanding's and agreement with the plan.      Please note that this dictation was created using voice recognition software. I have made every reasonable attempt to correct obvious errors, but I expect that there are errors of grammar and possibly content that I did not discover before finalizing the note.

## 2024-08-22 ENCOUNTER — APPOINTMENT (OUTPATIENT)
Dept: RADIOLOGY | Facility: MEDICAL CENTER | Age: 39
End: 2024-08-22
Attending: NURSE PRACTITIONER
Payer: COMMERCIAL

## 2024-08-22 DIAGNOSIS — R19.01 RIGHT UPPER QUADRANT ABDOMINAL MASS: ICD-10-CM

## 2024-08-22 PROCEDURE — 700117 HCHG RX CONTRAST REV CODE 255: Performed by: NURSE PRACTITIONER

## 2024-08-22 PROCEDURE — 74170 CT ABD WO CNTRST FLWD CNTRST: CPT

## 2024-08-22 RX ADMIN — IOHEXOL 100 ML: 350 INJECTION, SOLUTION INTRAVENOUS at 17:48

## 2024-08-26 ENCOUNTER — TELEPHONE (OUTPATIENT)
Dept: HEMATOLOGY ONCOLOGY | Facility: MEDICAL CENTER | Age: 39
End: 2024-08-26
Payer: COMMERCIAL

## 2024-08-26 DIAGNOSIS — R19.01 RIGHT UPPER QUADRANT ABDOMINAL MASS: ICD-10-CM

## 2024-08-26 NOTE — TELEPHONE ENCOUNTER
Patient originally seen back on 8/21/2024 after referral from urgent care provider for a subcutaneous mass in the right upper quadrant.  I recommended a CT scan of the abdomen with and without contrast which patient completed on 8/22/2024.  He does show 1.9 cm subcutaneous solid enhancing mass in the anterior right upper quadrant of the abdomen which is indeterminate and neoplasm cannot be excluded.  I personally spoke with Dr. Garza, our surgical oncologist who was agreed to see the patient.  Spoke to the patient on the phone this afternoon and he did verbalize understanding and agreed with the plan.      CT-ABDOMEN WITH & W/O    Result Date: 8/22/2024 8/22/2024 5:30 PM HISTORY/REASON FOR EXAM:  Solid vascularized subcutaneous right upper quadrant abdominal wall mass on U/S; Solid vascularized subcutaneous right upper quadrant abdominal wall mass on U/S. TECHNIQUE/EXAM DESCRIPTION:  CT scan of the abdomen without and with contrast. Initial precontrast images were obtained from the diaphragmatic domes through the iliac crests using helical technique.  Following nonionic contrast administration in an intravenous bolus fashion, and postcontrast, thin-section helical scanning obtained from the diaphragmatic domes through the iliac crests. 100 mL of Omnipaque 350 nonionic contrast was administered. Low dose optimization technique was utilized for this CT exam including automated exposure control and adjustment of the mA and/or kV according to patient size. COMPARISON: None. FINDINGS: Subcutaneous lesion anterior right upper quadrant. This is oval. Margins are smooth. There is enhancing. The precontrast density measurement is 27 and the postcontrast density measurement is 65. It measures 1.9 x 1.2 cm axial and 1.8 cm craniocaudal. The  lesion is indeterminant. The lesion corresponds with that seen on the prior ultrasound exam 8/4/2024. Lung bases are clear. Heart size is normal. Liver, spleen, adrenal glands, kidneys,  pancreas appear normal. Gallbladder is contracted. Visualized portions of bowel appear normal. No adenopathy. No ascites. No osseous lesion. Schmorl's nodes in the spine.  Bilateral L5 spondylolysis and 5 mm anterolisthesis of L5 on S1.     1.  1.9 cm subcutaneous solid enhancing mass in the anterior right upper quadrant abdomen. The lesion is indeterminate. Neoplasm cannot be excluded. Recommend surgical consult.     US-ABDOMEN LTD (SOFT TISSUE)    Result Date: 8/4/2024 8/4/2024 3:46 PM HISTORY/REASON FOR EXAM:  Abdominal mass RUQ, low suspicion for a hernia. TECHNIQUE/EXAM DESCRIPTION: Limited abdominal ultrasound. COMPARISON: None available. FINDINGS: There is a subcutaneous mass In the right upper quadrant of the abdomen. It is vascularized and therefore suspicious     Solid vascularized subcutaneous right upper quadrant abdominal wall mass. This could be inflammatory or neoplastic       1. Right upper quadrant abdominal mass  Referral to Surgical Oncology

## 2024-09-03 NOTE — PROGRESS NOTES
Subjective:   9/4/2024  9:27 AM  Primary care physician: Becca Combs M.D.  Referring Provider: NALINI Mcguire   Medical Oncologist: NALINI Mcguire     Chief Complaint:   Chief Complaint   Patient presents with    New Patient     1.9 CM RUQ ABD MASS  CT 8/22        Diagnosis:   1. Right upper quadrant abdominal mass        2. Abnormal finding on CT scan            History of presenting illness:    Leroy Thao is a pleasant 39 y.o. male who initially presented to urgent care on 8/2/24 for concerns of a 1.5 cm, firm palpable mass to his RUQ abdomen. This was first noticed approximately July 2024. Abdominal ultrasound on 8/4/24 noted solid vascularized subcutaneous right upper quadrant abdominal wall mass. He was eventually referred to medical oncology who ordered additional imaging for further evaluation and better visualization of this mass. CT ABDOMEN on 8/22/24 noted 1.9 cm subcutaneous solid enhancing mass in the anterior right upper quadrant abdomen with the lesion indeterminate and neoplasm cannot be excluded. Surgical consult was recommended.    He is here today for surgical evaluation.  He noted the lesion a few months ago.  He does state it seems to have increased slightly in size.  It slightly tender over the area of the lesion but no significant pain.  Has not noticed any skin changes or drainage.  No significant personal history of cancer.  History of breast cancer in his mother, throat cancer in his maternal grandfather.  No trauma that he can recall to the area.    Past Medical History:   Diagnosis Date    History of stem cell transplant (HCC) 2017    for knee and wrist    Osteoarthritis 09/26/2018    Left Wrist & Knee     Past Surgical History:   Procedure Laterality Date    [place] REPAIR OF NASAL SEPTUM N/A 10/12/2021    Procedure: SEPTOPLASTY, NOSE;  Surgeon: Prakash Torrez M.D.;  Location: SURGERY SAME DAY [facility];   Service: Ent    TURBINATE REDUCTION Bilateral 10/12/2021    Procedure: REDUCTION, NASAL TURBINATE - INFERIOR;  Surgeon: Prakash Torrez M.D.;  Location: SURGERY SAME DAY [facility];  Service: Ent    LUMBAR LAMINECTOMY DISKECTOMY Left 10/3/2018    Procedure: LUMBAR LAMINECTOMY DISKECTOMY - L5-S1 MICRODISKECTOMY;  Surgeon: Cristóbal Huerta M.D.;  Location: SURGERY [facility];  Service: Neurosurgery    MENISCUS REPAIR Left 2016    MENISCUS REPAIR Left 2011    REPAIR, KNEE, ACL Left 2009    OTHER      Torn Ligaments Left Wrist 2013     No Known Allergies  No outpatient encounter medications on file as of 9/4/2024.     No facility-administered encounter medications on file as of 9/4/2024.     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      Social Determinants of Health     Financial Resource Strain: Not on file   Food Insecurity: Not on file   Transportation Needs: Not on file   Physical Activity: Not on file   Stress: Not on file   Social Connections: Not on file   Intimate Partner Violence: Not on file   Housing Stability: Not on file      Social History     Tobacco Use   Smoking Status Never   Smokeless Tobacco Never     Social History     Substance and Sexual Activity   Alcohol Use Not Currently    Comment: Not in the last year     Social History     Substance and Sexual Activity   Drug Use No      Family History   Problem Relation Age of Onset     "Cancer Mother         Breast cancer    Cancer Paternal Aunt         Breast cancer    Cancer Paternal Grandfather         Throat cancer    Cancer Other         Breast cancer       Review of Systems   Constitutional:  Negative for chills, fever, malaise/fatigue and weight loss.   HENT:  Negative for congestion, ear discharge, ear pain, hearing loss and nosebleeds.    Eyes:  Negative for blurred vision, double vision, photophobia, pain and discharge.   Respiratory:  Negative for cough, hemoptysis and sputum production.    Cardiovascular:  Negative for chest pain, palpitations, orthopnea and claudication.   Gastrointestinal:  Negative for abdominal pain, constipation, diarrhea, heartburn, nausea and vomiting.   Genitourinary:  Negative for dysuria, frequency, hematuria and urgency.   Musculoskeletal:  Negative for back pain, joint pain, myalgias and neck pain.   Skin:  Negative for itching and rash.   Neurological:  Negative for dizziness, tingling, tremors, sensory change, speech change, focal weakness and headaches.   Endo/Heme/Allergies:  Negative for environmental allergies. Does not bruise/bleed easily.   Psychiatric/Behavioral:  Negative for depression, hallucinations, substance abuse and suicidal ideas. The patient is not nervous/anxious.         Objective:   /56 (BP Location: Right arm, Patient Position: Sitting, BP Cuff Size: Small adult)   Pulse (!) 54   Temp 36.4 °C (97.6 °F) (Temporal)   Ht 1.854 m (6' 0.99\")   Wt 92.1 kg (203 lb)   SpO2 98%   BMI 26.79 kg/m²     Physical Exam  Constitutional:       General: He is not in acute distress.     Appearance: He is not ill-appearing.   HENT:      Head: Normocephalic.   Eyes:      General: No scleral icterus.     Pupils: Pupils are equal, round, and reactive to light.   Cardiovascular:      Rate and Rhythm: Normal rate and regular rhythm.   Pulmonary:      Effort: Pulmonary effort is normal. No respiratory distress.   Abdominal:      General: Abdomen is " flat. There is no distension.      Palpations: Abdomen is soft.      Tenderness: There is no abdominal tenderness.      Comments: There is a palpable mobile soft mass in the right mid lateral abdomen.  Does not appear to be fixed to the underlying structures.  It is nontender on exam.   Skin:     Coloration: Skin is not jaundiced.   Neurological:      General: No focal deficit present.      Mental Status: He is alert and oriented to person, place, and time.         Labs  N/A     Imaging  CT ABD (8/22/24)  IMPRESSION:  1.  1.9 cm subcutaneous solid enhancing mass in the anterior right upper quadrant abdomen. The lesion is indeterminate. Neoplasm cannot be excluded. Recommend surgical consult.    US ABD (8/4/24)  IMPRESSION:  Solid vascularized subcutaneous right upper quadrant abdominal wall mass. This could be inflammatory or neoplastic    Pathology  N/A    Procedures  N/A    Diagnosis:     1. Right upper quadrant abdominal mass        2. Abnormal finding on CT scan            Medical Decision Making:  Today's Assessment / Status / Plan:     39-year-old male presents with a new 2 cm lesion in the right lateral abdominal wall.  It is mobile on exam.  Is nontender.  On imaging there is some enhancement but it appears largely homogenous in nature.    We discussed the potential etiologies of this lesion both benign and malignant.  Given its mobility but soft on exam my suspicion is that this is likely a benign lesion.  We discussed multiple options including an attempted biopsy versus surgical resection.  He prefer to proceed with surgical resection.  The risk benefits and alternatives of right abdominal wall mass resection were explained in detail to the patient including but not limited to bleeding, infection, pain, wound healing issues, recurrent mass, need for further procedures, VTE, MI, stroke and a very small risk of death.  After all his questions were answered he is in agreement to proceed.    Emiliano MARITNEZ  MD Kayla have entered, reviewed and confirmed the above diagnosis related to this patient on this date of service, September 4, 2024     Emiliano Garza M.D.

## 2024-09-04 ENCOUNTER — OFFICE VISIT (OUTPATIENT)
Dept: SURGICAL ONCOLOGY | Facility: MEDICAL CENTER | Age: 39
End: 2024-09-04
Payer: COMMERCIAL

## 2024-09-04 VITALS
HEART RATE: 54 BPM | HEIGHT: 73 IN | WEIGHT: 203 LBS | BODY MASS INDEX: 26.9 KG/M2 | DIASTOLIC BLOOD PRESSURE: 56 MMHG | SYSTOLIC BLOOD PRESSURE: 110 MMHG | TEMPERATURE: 97.6 F | OXYGEN SATURATION: 98 %

## 2024-09-04 DIAGNOSIS — R19.01 RIGHT UPPER QUADRANT ABDOMINAL MASS: ICD-10-CM

## 2024-09-04 DIAGNOSIS — R93.89 ABNORMAL FINDING ON CT SCAN: ICD-10-CM

## 2024-09-04 PROCEDURE — 3074F SYST BP LT 130 MM HG: CPT | Performed by: SURGERY

## 2024-09-04 PROCEDURE — 3078F DIAST BP <80 MM HG: CPT | Performed by: SURGERY

## 2024-09-04 PROCEDURE — 99205 OFFICE O/P NEW HI 60 MIN: CPT | Performed by: SURGERY

## 2024-09-04 ASSESSMENT — ENCOUNTER SYMPTOMS
DOUBLE VISION: 0
NERVOUS/ANXIOUS: 0
PHOTOPHOBIA: 0
BRUISES/BLEEDS EASILY: 0
DEPRESSION: 0
EYE PAIN: 0
CHILLS: 0
ORTHOPNEA: 0
BACK PAIN: 0
DIARRHEA: 0
HEADACHES: 0
MYALGIAS: 0
EYE DISCHARGE: 0
SENSORY CHANGE: 0
HEARTBURN: 0
FOCAL WEAKNESS: 0
NECK PAIN: 0
TINGLING: 0
ABDOMINAL PAIN: 0
TREMORS: 0
HEMOPTYSIS: 0
FEVER: 0
SPEECH CHANGE: 0
VOMITING: 0
NAUSEA: 0
BLURRED VISION: 0
DIZZINESS: 0
HALLUCINATIONS: 0
PALPITATIONS: 0
CONSTIPATION: 0
SPUTUM PRODUCTION: 0
WEIGHT LOSS: 0
CLAUDICATION: 0
COUGH: 0

## 2024-09-04 ASSESSMENT — LIFESTYLE VARIABLES: SUBSTANCE_ABUSE: 0

## 2024-09-06 ENCOUNTER — TELEPHONE (OUTPATIENT)
Dept: SURGICAL ONCOLOGY | Facility: MEDICAL CENTER | Age: 39
End: 2024-09-06
Payer: COMMERCIAL

## 2024-09-09 ENCOUNTER — APPOINTMENT (OUTPATIENT)
Dept: ADMISSIONS | Facility: MEDICAL CENTER | Age: 39
End: 2024-09-09
Attending: SURGERY
Payer: COMMERCIAL

## 2024-09-12 ENCOUNTER — PRE-ADMISSION TESTING (OUTPATIENT)
Dept: ADMISSIONS | Facility: MEDICAL CENTER | Age: 39
End: 2024-09-12
Attending: SURGERY
Payer: COMMERCIAL

## 2024-09-12 RX ORDER — DIAPER,BRIEF,ADULT, DISPOSABLE
1 EACH MISCELLANEOUS DAILY
COMMUNITY
End: 2024-10-25

## 2024-09-30 ENCOUNTER — ANESTHESIA (OUTPATIENT)
Dept: SURGERY | Facility: MEDICAL CENTER | Age: 39
End: 2024-09-30
Payer: COMMERCIAL

## 2024-09-30 ENCOUNTER — ANESTHESIA EVENT (OUTPATIENT)
Dept: SURGERY | Facility: MEDICAL CENTER | Age: 39
End: 2024-09-30
Payer: COMMERCIAL

## 2024-09-30 ENCOUNTER — HOSPITAL ENCOUNTER (OUTPATIENT)
Facility: MEDICAL CENTER | Age: 39
End: 2024-09-30
Attending: SURGERY | Admitting: SURGERY
Payer: COMMERCIAL

## 2024-09-30 VITALS
OXYGEN SATURATION: 99 % | DIASTOLIC BLOOD PRESSURE: 74 MMHG | WEIGHT: 198.63 LBS | SYSTOLIC BLOOD PRESSURE: 129 MMHG | TEMPERATURE: 97 F | BODY MASS INDEX: 26.33 KG/M2 | HEIGHT: 73 IN | RESPIRATION RATE: 14 BRPM | HEART RATE: 51 BPM

## 2024-09-30 LAB — PATHOLOGY CONSULT NOTE: NORMAL

## 2024-09-30 PROCEDURE — 700111 HCHG RX REV CODE 636 W/ 250 OVERRIDE (IP): Performed by: STUDENT IN AN ORGANIZED HEALTH CARE EDUCATION/TRAINING PROGRAM

## 2024-09-30 PROCEDURE — 22903 EXC ABD LES SC 3 CM/>: CPT | Performed by: SURGERY

## 2024-09-30 PROCEDURE — 700101 HCHG RX REV CODE 250: Performed by: STUDENT IN AN ORGANIZED HEALTH CARE EDUCATION/TRAINING PROGRAM

## 2024-09-30 PROCEDURE — 700105 HCHG RX REV CODE 258: Performed by: SURGERY

## 2024-09-30 PROCEDURE — 160009 HCHG ANES TIME/MIN: Performed by: SURGERY

## 2024-09-30 PROCEDURE — 160048 HCHG OR STATISTICAL LEVEL 1-5: Performed by: SURGERY

## 2024-09-30 PROCEDURE — A9270 NON-COVERED ITEM OR SERVICE: HCPCS | Performed by: STUDENT IN AN ORGANIZED HEALTH CARE EDUCATION/TRAINING PROGRAM

## 2024-09-30 PROCEDURE — 88307 TISSUE EXAM BY PATHOLOGIST: CPT

## 2024-09-30 PROCEDURE — 700105 HCHG RX REV CODE 258: Performed by: STUDENT IN AN ORGANIZED HEALTH CARE EDUCATION/TRAINING PROGRAM

## 2024-09-30 PROCEDURE — 88342 IMHCHEM/IMCYTCHM 1ST ANTB: CPT

## 2024-09-30 PROCEDURE — 700102 HCHG RX REV CODE 250 W/ 637 OVERRIDE(OP): Performed by: STUDENT IN AN ORGANIZED HEALTH CARE EDUCATION/TRAINING PROGRAM

## 2024-09-30 PROCEDURE — 160038 HCHG SURGERY MINUTES - EA ADDL 1 MIN LEVEL 2: Performed by: SURGERY

## 2024-09-30 PROCEDURE — 700101 HCHG RX REV CODE 250: Performed by: SURGERY

## 2024-09-30 PROCEDURE — 160027 HCHG SURGERY MINUTES - 1ST 30 MINS LEVEL 2: Performed by: SURGERY

## 2024-09-30 PROCEDURE — 160002 HCHG RECOVERY MINUTES (STAT): Performed by: SURGERY

## 2024-09-30 PROCEDURE — 160035 HCHG PACU - 1ST 60 MINS PHASE I: Performed by: SURGERY

## 2024-09-30 PROCEDURE — 88341 IMHCHEM/IMCYTCHM EA ADD ANTB: CPT

## 2024-09-30 RX ORDER — ONDANSETRON 2 MG/ML
4 INJECTION INTRAMUSCULAR; INTRAVENOUS
Status: DISCONTINUED | OUTPATIENT
Start: 2024-09-30 | End: 2024-09-30 | Stop reason: HOSPADM

## 2024-09-30 RX ORDER — BUPIVACAINE HYDROCHLORIDE AND EPINEPHRINE 5; 5 MG/ML; UG/ML
INJECTION, SOLUTION EPIDURAL; INTRACAUDAL; PERINEURAL
Status: DISCONTINUED | OUTPATIENT
Start: 2024-09-30 | End: 2024-09-30 | Stop reason: HOSPADM

## 2024-09-30 RX ORDER — HYDRALAZINE HYDROCHLORIDE 20 MG/ML
5 INJECTION INTRAMUSCULAR; INTRAVENOUS
Status: DISCONTINUED | OUTPATIENT
Start: 2024-09-30 | End: 2024-09-30 | Stop reason: HOSPADM

## 2024-09-30 RX ORDER — HYDROMORPHONE HYDROCHLORIDE 1 MG/ML
0.4 INJECTION, SOLUTION INTRAMUSCULAR; INTRAVENOUS; SUBCUTANEOUS
Status: DISCONTINUED | OUTPATIENT
Start: 2024-09-30 | End: 2024-09-30 | Stop reason: HOSPADM

## 2024-09-30 RX ORDER — SODIUM CHLORIDE, SODIUM LACTATE, POTASSIUM CHLORIDE, CALCIUM CHLORIDE 600; 310; 30; 20 MG/100ML; MG/100ML; MG/100ML; MG/100ML
INJECTION, SOLUTION INTRAVENOUS CONTINUOUS
Status: DISCONTINUED | OUTPATIENT
Start: 2024-09-30 | End: 2024-09-30 | Stop reason: HOSPADM

## 2024-09-30 RX ORDER — OXYCODONE HCL 5 MG/5 ML
10 SOLUTION, ORAL ORAL
Status: COMPLETED | OUTPATIENT
Start: 2024-09-30 | End: 2024-09-30

## 2024-09-30 RX ORDER — LABETALOL HYDROCHLORIDE 5 MG/ML
5 INJECTION, SOLUTION INTRAVENOUS
Status: DISCONTINUED | OUTPATIENT
Start: 2024-09-30 | End: 2024-09-30 | Stop reason: HOSPADM

## 2024-09-30 RX ORDER — HYDROMORPHONE HYDROCHLORIDE 1 MG/ML
0.1 INJECTION, SOLUTION INTRAMUSCULAR; INTRAVENOUS; SUBCUTANEOUS
Status: DISCONTINUED | OUTPATIENT
Start: 2024-09-30 | End: 2024-09-30 | Stop reason: HOSPADM

## 2024-09-30 RX ORDER — MIDAZOLAM HYDROCHLORIDE 1 MG/ML
INJECTION INTRAMUSCULAR; INTRAVENOUS PRN
Status: DISCONTINUED | OUTPATIENT
Start: 2024-09-30 | End: 2024-09-30 | Stop reason: SURG

## 2024-09-30 RX ORDER — LIDOCAINE HYDROCHLORIDE 20 MG/ML
INJECTION, SOLUTION EPIDURAL; INFILTRATION; INTRACAUDAL; PERINEURAL PRN
Status: DISCONTINUED | OUTPATIENT
Start: 2024-09-30 | End: 2024-09-30 | Stop reason: SURG

## 2024-09-30 RX ORDER — HALOPERIDOL 5 MG/ML
1 INJECTION INTRAMUSCULAR
Status: DISCONTINUED | OUTPATIENT
Start: 2024-09-30 | End: 2024-09-30 | Stop reason: HOSPADM

## 2024-09-30 RX ORDER — SODIUM CHLORIDE, SODIUM GLUCONATE, SODIUM ACETATE, POTASSIUM CHLORIDE AND MAGNESIUM CHLORIDE 526; 502; 368; 37; 30 MG/100ML; MG/100ML; MG/100ML; MG/100ML; MG/100ML
INJECTION, SOLUTION INTRAVENOUS
Status: DISCONTINUED | OUTPATIENT
Start: 2024-09-30 | End: 2024-09-30 | Stop reason: SURG

## 2024-09-30 RX ORDER — CEFAZOLIN SODIUM 1 G/3ML
INJECTION, POWDER, FOR SOLUTION INTRAMUSCULAR; INTRAVENOUS PRN
Status: DISCONTINUED | OUTPATIENT
Start: 2024-09-30 | End: 2024-09-30 | Stop reason: SURG

## 2024-09-30 RX ORDER — EPHEDRINE SULFATE 50 MG/ML
5 INJECTION, SOLUTION INTRAVENOUS
Status: DISCONTINUED | OUTPATIENT
Start: 2024-09-30 | End: 2024-09-30 | Stop reason: HOSPADM

## 2024-09-30 RX ORDER — DEXAMETHASONE SODIUM PHOSPHATE 4 MG/ML
INJECTION, SOLUTION INTRA-ARTICULAR; INTRALESIONAL; INTRAMUSCULAR; INTRAVENOUS; SOFT TISSUE PRN
Status: DISCONTINUED | OUTPATIENT
Start: 2024-09-30 | End: 2024-09-30 | Stop reason: SURG

## 2024-09-30 RX ORDER — HYDROMORPHONE HYDROCHLORIDE 1 MG/ML
0.2 INJECTION, SOLUTION INTRAMUSCULAR; INTRAVENOUS; SUBCUTANEOUS
Status: DISCONTINUED | OUTPATIENT
Start: 2024-09-30 | End: 2024-09-30 | Stop reason: HOSPADM

## 2024-09-30 RX ORDER — IPRATROPIUM BROMIDE AND ALBUTEROL SULFATE 2.5; .5 MG/3ML; MG/3ML
3 SOLUTION RESPIRATORY (INHALATION)
Status: DISCONTINUED | OUTPATIENT
Start: 2024-09-30 | End: 2024-09-30 | Stop reason: HOSPADM

## 2024-09-30 RX ORDER — MEPERIDINE HYDROCHLORIDE 25 MG/ML
12.5 INJECTION INTRAMUSCULAR; INTRAVENOUS; SUBCUTANEOUS
Status: DISCONTINUED | OUTPATIENT
Start: 2024-09-30 | End: 2024-09-30 | Stop reason: HOSPADM

## 2024-09-30 RX ORDER — OXYCODONE HCL 5 MG/5 ML
5 SOLUTION, ORAL ORAL
Status: COMPLETED | OUTPATIENT
Start: 2024-09-30 | End: 2024-09-30

## 2024-09-30 RX ORDER — DIPHENHYDRAMINE HYDROCHLORIDE 50 MG/ML
12.5 INJECTION INTRAMUSCULAR; INTRAVENOUS
Status: DISCONTINUED | OUTPATIENT
Start: 2024-09-30 | End: 2024-09-30 | Stop reason: HOSPADM

## 2024-09-30 RX ORDER — ONDANSETRON 2 MG/ML
INJECTION INTRAMUSCULAR; INTRAVENOUS PRN
Status: DISCONTINUED | OUTPATIENT
Start: 2024-09-30 | End: 2024-09-30 | Stop reason: SURG

## 2024-09-30 RX ADMIN — PROPOFOL 200 MG: 10 INJECTION, EMULSION INTRAVENOUS at 07:00

## 2024-09-30 RX ADMIN — OXYCODONE HYDROCHLORIDE 5 MG: 5 SOLUTION ORAL at 08:22

## 2024-09-30 RX ADMIN — SODIUM CHLORIDE, SODIUM GLUCONATE, SODIUM ACETATE, POTASSIUM CHLORIDE AND MAGNESIUM CHLORIDE: 526; 502; 368; 37; 30 INJECTION, SOLUTION INTRAVENOUS at 06:55

## 2024-09-30 RX ADMIN — FENTANYL CITRATE 50 MCG: 50 INJECTION, SOLUTION INTRAMUSCULAR; INTRAVENOUS at 07:00

## 2024-09-30 RX ADMIN — SODIUM CHLORIDE, POTASSIUM CHLORIDE, SODIUM LACTATE AND CALCIUM CHLORIDE: 600; 310; 30; 20 INJECTION, SOLUTION INTRAVENOUS at 06:16

## 2024-09-30 RX ADMIN — ONDANSETRON 4 MG: 2 INJECTION INTRAMUSCULAR; INTRAVENOUS at 07:28

## 2024-09-30 RX ADMIN — MIDAZOLAM HYDROCHLORIDE 2 MG: 2 INJECTION, SOLUTION INTRAMUSCULAR; INTRAVENOUS at 06:55

## 2024-09-30 RX ADMIN — DEXAMETHASONE SODIUM PHOSPHATE 4 MG: 4 INJECTION INTRA-ARTICULAR; INTRALESIONAL; INTRAMUSCULAR; INTRAVENOUS; SOFT TISSUE at 07:02

## 2024-09-30 RX ADMIN — CEFAZOLIN 2 G: 1 INJECTION, POWDER, FOR SOLUTION INTRAMUSCULAR; INTRAVENOUS at 07:02

## 2024-09-30 RX ADMIN — LIDOCAINE HYDROCHLORIDE 100 MG: 20 INJECTION, SOLUTION EPIDURAL; INFILTRATION; INTRACAUDAL at 07:00

## 2024-09-30 ASSESSMENT — PAIN SCALES - GENERAL: PAIN_LEVEL: 2

## 2024-09-30 ASSESSMENT — PAIN DESCRIPTION - PAIN TYPE
TYPE: SURGICAL PAIN

## 2024-09-30 NOTE — ANESTHESIA PROCEDURE NOTES
Airway    Date/Time: 9/30/2024 7:01 AM    Performed by: Juan Puckett M.D.  Authorized by: Juan Puckett M.D.    Location:  OR  Urgency:  Elective  Indications for Airway Management:  Anesthesia      Spontaneous Ventilation: absent    Sedation Level:  Deep  Preoxygenated: Yes    Mask Difficulty Assessment:  1 - vent by mask  Final Airway Type:  Supraglottic airway  Final Supraglottic Airway:  Standard LMA    SGA Size:  4  Number of Attempts at Approach:  1

## 2024-09-30 NOTE — DISCHARGE INSTRUCTIONS
HOME CARE INSTRUCTIONS    ACTIVITY: Rest and take it easy for the first 24 hours.  A responsible adult is recommended to remain with you during that time.  It is normal to feel sleepy.  We encourage you to not do anything that requires balance, judgment or coordination.    FOR 24 HOURS DO NOT:  Drive, operate machinery or run household appliances.  Drink beer or alcoholic beverages.  Make important decisions or sign legal documents.    SPECIAL INSTRUCTIONS: se above    DIET: To avoid nausea, slowly advance diet as tolerated, avoiding spicy or greasy foods for the first day.  Add more substantial food to your diet according to your physician's instructions.  Babies can be fed formula or breast milk as soon as they are hungry.  INCREASE FLUIDS AND FIBER TO AVOID CONSTIPATION.    MEDICATIONS: Resume taking daily medication.  Take prescribed pain medication with food.  If no medication is prescribed, you may take non-aspirin pain medication if needed.  PAIN MEDICATION CAN BE VERY CONSTIPATING.  Take a stool softener or laxative such as senokot, pericolace, or milk of magnesia if needed.    May take over the counter Tylenol of Ibuprofen for pain.  Last pain medication given at 8:22am.    A follow-up appointment should be arranged with your doctor; call to schedule.    You should CALL YOUR PHYSICIAN if you develop:  Fever greater than 101 degrees F.  Pain not relieved by medication, or persistent nausea or vomiting.  Excessive bleeding (blood soaking through dressing) or unexpected drainage from the wound.  Extreme redness or swelling around the incision site, drainage of pus or foul smelling drainage.  Inability to urinate or empty your bladder within 8 hours.  Problems with breathing or chest pain.    You should call 911 if you develop problems with breathing or chest pain.  If you are unable to contact your doctor or surgical center, you should go to the nearest emergency room or urgent care center.  Physician's  telephone #: [phone]    MILD FLU-LIKE SYMPTOMS ARE NORMAL.  YOU MAY EXPERIENCE GENERALIZED MUSCLE ACHES, THROAT IRRITATION, HEADACHE AND/OR SOME NAUSEA.    If any questions arise, call your doctor.  If your doctor is not available, please feel free to call the Surgical Center at [phone].  The Center is open Monday through Friday from 7AM to 7PM.      A registered nurse may call you a few days after your surgery to see how you are doing after your procedure.    You may also receive a survey in the mail within the next two weeks and we ask that you take a few moments to complete the survey and return it to us.  Our goal is to provide you with very good care and we value your comments.     Depression / Suicide Risk    As you are discharged from this [facility] facility, it is important to learn how to keep safe from harming yourself.    Recognize the warning signs:  Abrupt changes in personality, positive or negative- including increase in energy   Giving away possessions  Change in eating patterns- significant weight changes-  positive or negative  Change in sleeping patterns- unable to sleep or sleeping all the time   Unwillingness or inability to communicate  Depression  Unusual sadness, discouragement and loneliness  Talk of wanting to die  Neglect of personal appearance   Rebelliousness- reckless behavior  Withdrawal from people/activities they love  Confusion- inability to concentrate     If you or a loved one observes any of these behaviors or has concerns about self-harm, here's what you can do:  Talk about it- your feelings and reasons for harming yourself  Remove any means that you might use to hurt yourself (examples: pills, rope, extension cords, firearm)  Get professional help from the community (Mental Health, Substance Abuse, psychological counseling)  Do not be alone:Call your Safe Contact- someone whom you trust who will be there for you.  Call your local CRISIS HOTLINE 662-9564 or  305-674-9840  Call your local Children's Mobile Crisis Response Team [place] (161) 383-8209 or www.[employer].Immunovaccine  Call the toll free National Suicide Prevention Hotlines   National Suicide Prevention Lifeline 272-306-JMLS (5390)  [facility] Line Network 800-SUICIDE (301-8380)    I acknowledge receipt and understanding of these Home Care instructions.

## 2024-09-30 NOTE — ANESTHESIA TIME REPORT
Anesthesia Start and Stop Event Times       Date Time Event    9/30/2024 0655 Anesthesia Start     0748 Anesthesia Stop          Responsible Staff  09/30/24      Name Role Begin End    Juan Puckett M.D. Anesth [id number]          Overtime Reason:  no overtime (within assigned shift)    Comments:

## 2024-09-30 NOTE — ANESTHESIA PREPROCEDURE EVALUATION
Case: [id number] Date/Time: 09/30/24 0645    Procedure: RESECTION OF RIGHT ABDOMINAL WALL MASS    Pre-op diagnosis: RIGHT ABDOMINAL WALL MASS    Location: [facility] OR 15 / SURGERY [facility]    Surgeons: Emiliano Garza M.D.          40 yo M w/ abdominal wall mass    Relevant Problems   No relevant active problems       Physical Exam    Airway   Mallampati: II  TM distance: >3 FB  Neck ROM: full       Cardiovascular - normal exam  Rhythm: regular  Rate: normal  (-) murmur     Dental - normal exam           Pulmonary - normal exam  Breath sounds clear to auscultation     Abdominal    Neurological - normal exam                   Anesthesia Plan    ASA 2       Plan - general       Airway plan will be LMA          Induction: intravenous    Postoperative Plan: Postoperative administration of opioids is intended.    Pertinent diagnostic labs and testing reviewed    Informed Consent:    Anesthetic plan and risks discussed with patient.    Use of blood products discussed with: patient whom consented to blood products.

## 2024-09-30 NOTE — OP REPORT
Date of Operation: 9/30/2024    Preoperative Diagnosis: Right upper abdominal wall mass     Postoperative Diagnosis: Same    Operative Procedure: Resection of right abdominal wall mass, 2 x 2 cm      Wound class: Clean    Surgeon: Emiliano Garza MD    Assistant: Jay Jay Kelley NP. The indication for a surgical assistant in this surgery were indicated due to the complexity of the procedure.  Their role included aiding in the incision, retraction, holding of devices including cameras for laparoscopic procedures and closure of wounds.      Anesthesia: General     Estimated Blood Loss: 5 cc    Specimens: Right abdominal wall mass      Findings: Well encapsulated circumscribed approximately 2 x 2 cm mass in the subcutaneous tissue.       Counts: Sponge, needle, and instrument counts were reported correct at the conclusion of the operation x2.       Indication: 39-year-old male originally presented to me with a newly palpated mass within the right upper abdominal wall.  The risks benefits and alternatives of resection of right upper abdominal wall mass were explained in detail the patient was in agreement to proceed.    DESCRIPTION OF PROCEDURE   Patient was brought to the operating room laid in supine position underwent general endotracheal anesthesia.  Pressure points were all padded Venodyne's were placed on lower extremities.  He received preoperative antibiotics.  The right abdomen was prepped and draped in a normal sterile fashion.  Timeout was performed.    The area directly over the palpable mass was infiltrated with 0.5% Marcaine with epinephrine.  A approximately 3 cm incision was made directly over the mass.  Soft tissue was dissected down to subcutaneous tissue.  Area around the mass was then bluntly dissected free.  There were a couple perforating vessels that were encircled with ties and ligated.  Circumferential dissection was carried out of the mass until it was free from everything but the deep  tissue.  It was gently dissected off of the deep tissue using electrocautery.  It was then passed off the field.  The wound was copiously irrigated and hemostasis was ensured.  Additional 0.5% Marcaine with epinephrine was injected at the surgical site for total of 30 cc during the case.  The deep tissue was brought together with 3-0 Vicryl sutures.  Skin was then closed with 3-0 deep dermals 4-0 Monocryl and Dermabond.     The patient was then awakened and transferred to PACU in stable condition.     Disposition: Discharge from PACU once stable    Emiliano Garza MD  September 30, 2024, 7:43 AM

## 2024-09-30 NOTE — OR NURSING
Pt A&OX4. VSS. Pt on room air. Afebrile. One small incision to R side of abd, dermabond closure. Ice pack in place over site. Pt reports pain is tolerable to side post PO pain medication administration. No complaints of nausea, tolerated sips of water. No new scripts. IV d/c'd.   D/C instructions reviewed with pt and wife, Nishi. Pt steady on feet, denies dizziness. Pt out of PACU walking, escorted out by this RN. All personal belongings with pt.    Home

## 2024-09-30 NOTE — PROGRESS NOTES
Medication history reviewed with PT at bedside    Med rec is complete per PT reporting    Allergies reviewed.     Patient denies any outpatient antibiotics in the last 30 days.     Patient is not taking anticoagulants.    Preferred pharmacy for this visit - [facility] on [address] ([phone])                 [Well Developed] : well developed [Well Nourished] : well nourished [No Acute Distress] : no acute distress [Normal Conjunctiva] : normal conjunctiva [Normal Venous Pressure] : normal venous pressure [No Carotid Bruit] : no carotid bruit [Normal S1, S2] : normal S1, S2 [No Murmur] : no murmur [No Rub] : no rub [No Gallop] : no gallop [Clear Lung Fields] : clear lung fields [Good Air Entry] : good air entry [No Respiratory Distress] : no respiratory distress  [Soft] : abdomen soft [Non Tender] : non-tender [No Masses/organomegaly] : no masses/organomegaly [Normal Bowel Sounds] : normal bowel sounds [Normal Gait] : normal gait [No Edema] : no edema [No Cyanosis] : no cyanosis [No Clubbing] : no clubbing [No Varicosities] : no varicosities [No Rash] : no rash [No Skin Lesions] : no skin lesions [Moves all extremities] : moves all extremities [No Focal Deficits] : no focal deficits [Normal Speech] : normal speech [Alert and Oriented] : alert and oriented [Normal memory] : normal memory [de-identified] : right mastectomy

## 2024-10-01 ENCOUNTER — TELEPHONE (OUTPATIENT)
Dept: SURGICAL ONCOLOGY | Facility: MEDICAL CENTER | Age: 39
End: 2024-10-01
Payer: COMMERCIAL

## 2024-10-01 DIAGNOSIS — Z98.890 HX OF EXCISION OF MASS: ICD-10-CM

## 2024-10-01 DIAGNOSIS — R93.89 ABNORMAL FINDING ON CT SCAN: ICD-10-CM

## 2024-10-01 DIAGNOSIS — R19.01 RIGHT UPPER QUADRANT ABDOMINAL MASS: ICD-10-CM

## 2024-10-01 RX ORDER — POLYETHYLENE GLYCOL 3350 17 G/17G
17 POWDER, FOR SOLUTION ORAL DAILY
Qty: 7 PACKET | Refills: 0 | Status: SHIPPED | OUTPATIENT
Start: 2024-10-01 | End: 2024-10-08

## 2024-10-01 RX ORDER — TRAMADOL HYDROCHLORIDE 50 MG/1
50-100 TABLET ORAL EVERY 6 HOURS PRN
Qty: 30 TABLET | Refills: 0 | Status: SHIPPED | OUTPATIENT
Start: 2024-10-01 | End: 2024-10-08

## 2024-10-01 RX ORDER — DOCUSATE SODIUM 100 MG/1
100 CAPSULE, LIQUID FILLED ORAL 2 TIMES DAILY
Qty: 14 CAPSULE | Refills: 0 | Status: SHIPPED | OUTPATIENT
Start: 2024-10-01 | End: 2024-10-08

## 2024-10-10 ENCOUNTER — TELEPHONE (OUTPATIENT)
Dept: SURGICAL ONCOLOGY | Facility: MEDICAL CENTER | Age: 39
End: 2024-10-10
Payer: COMMERCIAL

## 2024-10-14 ENCOUNTER — TELEPHONE (OUTPATIENT)
Dept: SURGICAL ONCOLOGY | Facility: MEDICAL CENTER | Age: 39
End: 2024-10-14
Payer: COMMERCIAL

## 2024-10-15 ENCOUNTER — TELEPHONE (OUTPATIENT)
Dept: SURGICAL ONCOLOGY | Facility: MEDICAL CENTER | Age: 39
End: 2024-10-15
Payer: COMMERCIAL

## 2024-10-23 ENCOUNTER — OFFICE VISIT (OUTPATIENT)
Dept: SURGICAL ONCOLOGY | Facility: MEDICAL CENTER | Age: 39
End: 2024-10-23
Payer: COMMERCIAL

## 2024-10-23 VITALS
DIASTOLIC BLOOD PRESSURE: 72 MMHG | OXYGEN SATURATION: 98 % | BODY MASS INDEX: 26.9 KG/M2 | TEMPERATURE: 97.6 F | SYSTOLIC BLOOD PRESSURE: 108 MMHG | HEART RATE: 55 BPM | HEIGHT: 73 IN | WEIGHT: 203 LBS

## 2024-10-23 DIAGNOSIS — R19.01 RIGHT UPPER QUADRANT ABDOMINAL MASS: ICD-10-CM

## 2024-10-23 DIAGNOSIS — Z98.890 HX OF EXCISION OF MASS: ICD-10-CM

## 2024-10-23 DIAGNOSIS — R93.89 ABNORMAL FINDING ON CT SCAN: ICD-10-CM

## 2024-10-23 PROCEDURE — 99024 POSTOP FOLLOW-UP VISIT: CPT | Performed by: SURGERY

## 2024-10-23 PROCEDURE — 3074F SYST BP LT 130 MM HG: CPT | Performed by: SURGERY

## 2024-10-23 PROCEDURE — 3078F DIAST BP <80 MM HG: CPT | Performed by: SURGERY

## 2024-10-24 ASSESSMENT — ENCOUNTER SYMPTOMS
EYE PAIN: 0
BACK PAIN: 0
NERVOUS/ANXIOUS: 0
DEPRESSION: 0
SPEECH CHANGE: 0
PHOTOPHOBIA: 0
CLAUDICATION: 0
NAUSEA: 0
FOCAL WEAKNESS: 0
SENSORY CHANGE: 0
SPUTUM PRODUCTION: 0
WEIGHT LOSS: 0
CONSTIPATION: 0
TINGLING: 0
DOUBLE VISION: 0
FEVER: 0
ABDOMINAL PAIN: 0
CHILLS: 0
BRUISES/BLEEDS EASILY: 0
MYALGIAS: 0
TREMORS: 0
HEMOPTYSIS: 0
EYE DISCHARGE: 0
HALLUCINATIONS: 0
BLURRED VISION: 0
DIZZINESS: 0
HEADACHES: 0
COUGH: 0
PALPITATIONS: 0
DIARRHEA: 0
VOMITING: 0
ORTHOPNEA: 0
HEARTBURN: 0
NECK PAIN: 0

## 2024-10-24 ASSESSMENT — LIFESTYLE VARIABLES: SUBSTANCE_ABUSE: 0

## 2024-10-25 ENCOUNTER — OFFICE VISIT (OUTPATIENT)
Dept: URGENT CARE | Facility: CLINIC | Age: 39
End: 2024-10-25
Payer: COMMERCIAL

## 2024-10-25 VITALS
WEIGHT: 199 LBS | TEMPERATURE: 98.1 F | RESPIRATION RATE: 18 BRPM | SYSTOLIC BLOOD PRESSURE: 104 MMHG | DIASTOLIC BLOOD PRESSURE: 62 MMHG | BODY MASS INDEX: 26.37 KG/M2 | HEART RATE: 65 BPM | OXYGEN SATURATION: 97 % | HEIGHT: 73 IN

## 2024-10-25 DIAGNOSIS — J02.0 STREP PHARYNGITIS: ICD-10-CM

## 2024-10-25 LAB
FLUAV RNA SPEC QL NAA+PROBE: NEGATIVE
FLUBV RNA SPEC QL NAA+PROBE: NEGATIVE
RSV RNA SPEC QL NAA+PROBE: NEGATIVE
S PYO DNA SPEC NAA+PROBE: DETECTED
SARS-COV-2 RNA RESP QL NAA+PROBE: NEGATIVE

## 2024-10-25 PROCEDURE — 99213 OFFICE O/P EST LOW 20 MIN: CPT | Performed by: PHYSICIAN ASSISTANT

## 2024-10-25 PROCEDURE — 1126F AMNT PAIN NOTED NONE PRSNT: CPT | Performed by: PHYSICIAN ASSISTANT

## 2024-10-25 PROCEDURE — 3074F SYST BP LT 130 MM HG: CPT | Performed by: PHYSICIAN ASSISTANT

## 2024-10-25 PROCEDURE — 87651 STREP A DNA AMP PROBE: CPT | Performed by: PHYSICIAN ASSISTANT

## 2024-10-25 PROCEDURE — 3078F DIAST BP <80 MM HG: CPT | Performed by: PHYSICIAN ASSISTANT

## 2024-10-25 PROCEDURE — 0241U POCT CEPHEID COV-2, FLU A/B, RSV - PCR: CPT | Performed by: PHYSICIAN ASSISTANT

## 2024-10-25 RX ORDER — AMOXICILLIN 500 MG/1
500 CAPSULE ORAL 2 TIMES DAILY
Qty: 20 CAPSULE | Refills: 0 | Status: SHIPPED | OUTPATIENT
Start: 2024-10-25 | End: 2024-11-04

## 2024-10-25 ASSESSMENT — ENCOUNTER SYMPTOMS
DIAPHORESIS: 0
DIZZINESS: 0
MYALGIAS: 1
NAUSEA: 0
SORE THROAT: 1
DIARRHEA: 0
SHORTNESS OF BREATH: 0
CHILLS: 1
FEVER: 1
ABDOMINAL PAIN: 0
COUGH: 1
PALPITATIONS: 0
VOMITING: 0
HEADACHES: 1
WHEEZING: 0
SPUTUM PRODUCTION: 0
SINUS PAIN: 0

## 2024-10-25 ASSESSMENT — PAIN SCALES - GENERAL: PAINLEVEL: NO PAIN

## 2025-04-22 NOTE — PROGRESS NOTES
Subjective:      Primary care physician: Pcp Pt States None  Referring Provider: NALINI Mcguire   Medical Oncologist: NALINI Mcguire     Chief Complaint: No chief complaint on file.    Diagnosis:   1. Hx of excision of mass        2. Right upper quadrant abdominal mass        3. Abnormal finding on CT scan            History of presenting illness:    Leroy Thao is a pleasant 40 y.o. male who initially presented to urgent care on 8/2/24 for concerns of a 1.5 cm, firm palpable mass to his RUQ abdomen. This was first noticed approximately July 2024. Abdominal ultrasound on 8/4/24 noted solid vascularized subcutaneous right upper quadrant abdominal wall mass. He was eventually referred to medical oncology who ordered additional imaging for further evaluation and better visualization of this mass. CT ABDOMEN on 8/22/24 noted 1.9 cm subcutaneous solid enhancing mass in the anterior right upper quadrant abdomen with the lesion indeterminate and neoplasm cannot be excluded. Surgical consult was recommended.     He is here today for surgical evaluation.  He noted the lesion a few months ago.  He does state it seems to have increased slightly in size.  It slightly tender over the area of the lesion but no significant pain.  Has not noticed any skin changes or drainage.  No significant personal history of cancer.  History of breast cancer in his mother, throat cancer in his maternal grandfather.  No trauma that he can recall to the area.     Update 10/23/24  On 9/30/24 he completed surgery by Dr. 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   He was eventually discharged later that same day.     He is here today for postoperative follow-up.  He has been doing very well since  surgery.  He is had no issues.  Pain is controlled.  Healing is not been an issue.  No drainage from his incision erythema or problems.    Update 4/29/25  He is here today for ***    Past Medical History:   Diagnosis Date    History of stem cell transplant (HCC) 2017    for knee and wrist    Osteoarthritis 09/26/2018    Left Wrist & Knee     Past Surgical History:   Procedure Laterality Date    EXCISION, LESION, ABDOMINAL WALL Right 9/30/2024    Procedure: RESECTION OF RIGHT ABDOMINAL WALL MASS;  Surgeon: Emiliano Garza M.D.;  Location: SURGERY [facility];  Service: General    ND REPAIR OF NASAL SEPTUM N/A 10/12/2021    Procedure: SEPTOPLASTY, NOSE;  Surgeon: Prakash Torrez M.D.;  Location: SURGERY SAME DAY [facility];  Service: Ent    TURBINATE REDUCTION Bilateral 10/12/2021    Procedure: REDUCTION, NASAL TURBINATE - INFERIOR;  Surgeon: Prakash Torrez M.D.;  Location: SURGERY SAME DAY [facility];  Service: Ent    LUMBAR LAMINECTOMY DISKECTOMY Left 10/03/2018    Procedure: LUMBAR LAMINECTOMY DISKECTOMY - L5-S1 MICRODISKECTOMY;  Surgeon: Cristóbal Huerta M.D.;  Location: SURGERY [facility];  Service: Neurosurgery    MENISCUS REPAIR Left 2016    MENISCUS REPAIR Left 2011    REPAIR, KNEE, ACL Left 2009    NO PERTINENT PAST SURGICAL HISTORY  2011,2016,2018,2021    Knee surgeries, back surgery, nose surgery    OTHER      Torn Ligaments Left Wrist 2013     No Known Allergies  No outpatient encounter medications on file as of 4/29/2025.     No facility-administered encounter medications on file as of 4/29/2025.     Social History     Socioeconomic History    Marital status:      Spouse name: Not on file    Number of children: Not on file    Years of education: Not on file    Highest education level: Not on file   Occupational History    Not on file   Tobacco Use    Smoking status: Never    Smokeless tobacco: Never   Vaping Use    Vaping status: Never Used   Substance and Sexual Activity     "Alcohol use: Not Currently     Comment: Not in the last year    Drug use: No    Sexual activity: Not on file   Other Topics Concern    Not on file   Social History Narrative    Works for [employer] for      Social Drivers of Health     Financial Resource Strain: Not on file   Food Insecurity: Not on file   Transportation Needs: Not on file   Physical Activity: Not on file   Stress: Not on file   Social Connections: Not on file   Intimate Partner Violence: Not on file   Housing Stability: Not on file      Social History     Tobacco Use   Smoking Status Never   Smokeless Tobacco Never     Social History     Substance and Sexual Activity   Alcohol Use Not Currently    Comment: Not in the last year     Social History     Substance and Sexual Activity   Drug Use No      Family History   Problem Relation Age of Onset    Cancer Mother         Breast cancer    Cancer Paternal Aunt         Breast cancer    Cancer Paternal Grandfather         Throat cancer    Cancer Other         Breast cancer       ROS     Objective:   There were no vitals taken for this visit.    Physical Exam    Labs  N/A      Imaging  CT ABD (8/22/24)  IMPRESSION:  1.  1.9 cm subcutaneous solid enhancing mass in the anterior right upper quadrant abdomen. The lesion is indeterminate. Neoplasm cannot be excluded. Recommend surgical consult.     US ABD (8/4/24)  IMPRESSION:  Solid vascularized subcutaneous right upper quadrant abdominal wall mass. 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 Dr. Ng reviewed the slides of this case prior to   requesting consultative opinion by [facility].      Procedures  9/30/24 Resection of " right abdominal wall mass, 2 x 2 cm by Dr. Garza    Diagnosis:     1. Hx of excision of mass        2. Right upper quadrant abdominal mass        3. Abnormal finding on CT scan            Medical Decision Making:  Today's Assessment / Status / Plan:     ***    I, Dr. Garza, have entered, reviewed and confirmed the above diagnoses related to this patient on this date of service, as per the time and date noted at top of this note.    "vascularized subcutaneous right upper quadrant abdominal wall mass. 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 Dr. Ng reviewed the slides of this case prior to   requesting consultative opinion by [facility].      Procedures  9/30/24 Resection of right abdominal wall mass, 2 x 2 cm by Dr. Garza    Diagnosis:     1. Hx of excision of mass        2. Right upper quadrant abdominal mass        3. Abnormal finding on CT scan            Medical Decision Making:  Today's Assessment / Status / Plan:     39-year-old male presents with a new 2 cm lesion in the right lateral abdominal wall.  He underwent surgical resection on 9/30/2024.  Pathology after referral to [facility] was consistent with a nonneuro granular cell tumor.  I explained the rarity of these tumors.     Presents today for follow-up.  Been doing very well.  No evidence of recurrence of the mass.  I will see him back in 1 year for follow-up.    I, Emiliano Garza MD have entered, reviewed and confirmed the above diagnosis related to this patient on this date of service, April 29, 2025     Emiliano Garza M.D.    "

## 2025-04-24 ENCOUNTER — RESULTS FOLLOW-UP (OUTPATIENT)
Dept: URGENT CARE | Facility: CLINIC | Age: 40
End: 2025-04-24

## 2025-04-24 ENCOUNTER — OFFICE VISIT (OUTPATIENT)
Dept: URGENT CARE | Facility: CLINIC | Age: 40
End: 2025-04-24
Payer: COMMERCIAL

## 2025-04-24 ENCOUNTER — APPOINTMENT (OUTPATIENT)
Dept: URGENT CARE | Facility: PHYSICIAN GROUP | Age: 40
End: 2025-04-24
Payer: COMMERCIAL

## 2025-04-24 VITALS
BODY MASS INDEX: 27.17 KG/M2 | OXYGEN SATURATION: 96 % | WEIGHT: 205 LBS | RESPIRATION RATE: 14 BRPM | DIASTOLIC BLOOD PRESSURE: 60 MMHG | TEMPERATURE: 97.7 F | SYSTOLIC BLOOD PRESSURE: 96 MMHG | HEART RATE: 67 BPM | HEIGHT: 73 IN

## 2025-04-24 DIAGNOSIS — J02.0 STREP PHARYNGITIS: ICD-10-CM

## 2025-04-24 LAB — S PYO DNA SPEC NAA+PROBE: DETECTED

## 2025-04-24 PROCEDURE — 99213 OFFICE O/P EST LOW 20 MIN: CPT

## 2025-04-24 PROCEDURE — 3078F DIAST BP <80 MM HG: CPT

## 2025-04-24 PROCEDURE — 87651 STREP A DNA AMP PROBE: CPT

## 2025-04-24 PROCEDURE — 3074F SYST BP LT 130 MM HG: CPT

## 2025-04-24 RX ORDER — AMOXICILLIN 500 MG/1
500 CAPSULE ORAL 2 TIMES DAILY
Qty: 20 CAPSULE | Refills: 0 | Status: SHIPPED | OUTPATIENT
Start: 2025-04-24 | End: 2025-05-04

## 2025-04-24 RX ORDER — DEXAMETHASONE SODIUM PHOSPHATE 10 MG/ML
10 INJECTION, SOLUTION INTRA-ARTICULAR; INTRALESIONAL; INTRAMUSCULAR; INTRAVENOUS; SOFT TISSUE ONCE
Status: COMPLETED | OUTPATIENT
Start: 2025-04-24 | End: 2025-04-24

## 2025-04-24 RX ADMIN — DEXAMETHASONE SODIUM PHOSPHATE 10 MG: 10 INJECTION, SOLUTION INTRA-ARTICULAR; INTRALESIONAL; INTRAMUSCULAR; INTRAVENOUS; SOFT TISSUE at 16:13

## 2025-04-24 ASSESSMENT — ENCOUNTER SYMPTOMS
FEVER: 0
SORE THROAT: 1

## 2025-04-24 NOTE — PROGRESS NOTES
Verbal consent was acquired by the patient to use [employer] ambient listening note generation during this visit   Subjective:   Leroy Thao is a 40 y.o. male who presents for Sore Throat (Body ache, x1 week.)      HPI:  History of Present Illness  The patient is a 40-year-old male who presents for evaluation of a sore throat.    He reports the onset of a severe sore throat approximately one week ago, which was accompanied by body aches over the weekend. The sore throat has persisted, although the body aches have lessened in intensity. Discomfort is rated as a 6 on a scale of 0 to 10. No fevers have been experienced, but poor sleep quality due to discomfort is reported. No medications have been taken for symptom relief. Mild dysphagia and morning nasal discharge are also reported, but there are no ear-related symptoms or cough. A history of strep throat is noted.       Review of Systems   Constitutional:  Negative for fever.   HENT:  Positive for sore throat.        Medications:    No current outpatient medications on file prior to visit.     No current facility-administered medications on file prior to visit.        Allergies:   Patient has no known allergies.    Problem List:   Patient Active Problem List   Diagnosis    Right upper quadrant abdominal mass        Surgical History:  Past Surgical History:   Procedure Laterality Date    EXCISION, LESION, ABDOMINAL WALL Right 9/30/2024    Procedure: RESECTION OF RIGHT ABDOMINAL WALL MASS;  Surgeon: Emiliano Garza M.D.;  Location: SURGERY [facility];  Service: General    [place] REPAIR OF NASAL SEPTUM N/A 10/12/2021    Procedure: SEPTOPLASTY, NOSE;  Surgeon: Prakash Torrez M.D.;  Location: SURGERY SAME DAY [facility];  Service: Ent    TURBINATE REDUCTION Bilateral 10/12/2021    Procedure: REDUCTION, NASAL TURBINATE - INFERIOR;  Surgeon: Prakash Torrez M.D.;  Location: SURGERY SAME DAY [facility];  Service: Ent    LUMBAR LAMINECTOMY DISKECTOMY Left  "10/03/2018    Procedure: LUMBAR LAMINECTOMY DISKECTOMY - L5-S1 MICRODISKECTOMY;  Surgeon: Cristóbal Huerta M.D.;  Location: SURGERY [facility];  Service: Neurosurgery    MENISCUS REPAIR Left 2016    MENISCUS REPAIR Left 2011    REPAIR, KNEE, ACL Left 2009    NO PERTINENT PAST SURGICAL HISTORY  2011,2016,2018,2021    Knee surgeries, back surgery, nose surgery    OTHER      Torn Ligaments Left Wrist 2013       Past Social Hx:   Social History     Tobacco Use    Smoking status: Never    Smokeless tobacco: Never   Vaping Use    Vaping status: Never Used   Substance Use Topics    Alcohol use: Not Currently     Comment: Not in the last year    Drug use: No          Problem list, medications, and allergies reviewed by myself today in Epic.     Objective:     BP 96/60 (BP Location: Left arm, Patient Position: Sitting, BP Cuff Size: Adult)   Pulse 67   Temp 36.5 °C (97.7 °F) (Temporal)   Resp 14   Ht 1.854 m (6' 1\")   Wt 93 kg (205 lb)   SpO2 96%   BMI 27.05 kg/m²     Physical Exam  Vitals and nursing note reviewed.   Constitutional:       General: He is not in acute distress.     Appearance: Normal appearance. He is normal weight. He is not ill-appearing, toxic-appearing or diaphoretic.   HENT:      Head: Normocephalic and atraumatic.      Nose: Nose normal. No congestion or rhinorrhea.      Mouth/Throat:      Mouth: Mucous membranes are moist.      Pharynx: Oropharynx is clear. Posterior oropharyngeal erythema present. No oropharyngeal exudate.   Cardiovascular:      Rate and Rhythm: Normal rate and regular rhythm.      Pulses: Normal pulses.      Heart sounds: Normal heart sounds. No murmur heard.     No friction rub. No gallop.   Pulmonary:      Effort: Pulmonary effort is normal. No respiratory distress.      Breath sounds: Normal breath sounds. No stridor. No wheezing, rhonchi or rales.   Chest:      Chest wall: No tenderness.   Musculoskeletal:      Cervical back: Normal range of motion and neck " supple. No tenderness.   Skin:     General: Skin is warm and dry.      Capillary Refill: Capillary refill takes less than 2 seconds.   Neurological:      General: No focal deficit present.      Mental Status: He is alert and oriented to person, place, and time. Mental status is at baseline.      Gait: Gait normal.   Psychiatric:         Mood and Affect: Mood normal.         Behavior: Behavior normal.         Thought Content: Thought content normal.         Judgment: Judgment normal.         Assessment/Plan:     Diagnosis and associated orders:   1. Strep pharyngitis  POCT GROUP A STREP, PCR    dexamethasone (Decadron) injection (check route below) 10 mg    amoxicillin (AMOXIL) 500 MG Cap         Results for orders placed or performed in visit on 04/24/25   POCT GROUP A STREP, PCR    Collection Time: 04/24/25  4:10 PM   Result Value Ref Range    POC Group A Strep, PCR Detected (A) Not Detected, Invalid        Comments/MDM:   Pt is clinically stable at today's acute urgent care visit.  No acute distress noted. Appropriate for outpatient management at this time.     Assessment & Plan  Acute problem  Strep testing positive  Amoxicillin as prescribed  Warm salt water gargles  Alternate Tylenol ibuprofen as needed for pain  Replace toothbrush in 48 hours  Return for any new or worsening signs or symptoms            Discussed DDx, management options (risks,benefits, and alternatives to planned treatment), natural progression and supportive care.  Expressed understanding and the treatment plan was agreed upon. Questions were encouraged and answered   Return to urgent care prn if new or worsening sx or if there is no improvement in condition prn.    Educated in Red flags and indications to immediately call 911 or present to the Emergency Department.   Advised the patient to follow-up with the primary care physician for recheck, reevaluation, and consideration of further management.    I personally reviewed prior external  notes and test results pertinent to today's visit.  I have independently reviewed and interpreted all diagnostics ordered during this urgent care acute visit.       Please note that this dictation was created using voice recognition software. I have made a reasonable attempt to correct obvious errors, but I expect that there are errors of grammar and possibly content that I did not discover before finalizing the note.    This note was electronically signed by MARLON Ahumada

## 2025-04-29 ENCOUNTER — OFFICE VISIT (OUTPATIENT)
Facility: MEDICAL CENTER | Age: 40
End: 2025-04-29
Payer: COMMERCIAL

## 2025-04-29 VITALS
WEIGHT: 205.03 LBS | HEART RATE: 65 BPM | OXYGEN SATURATION: 98 % | TEMPERATURE: 97.4 F | SYSTOLIC BLOOD PRESSURE: 116 MMHG | DIASTOLIC BLOOD PRESSURE: 62 MMHG | HEIGHT: 73 IN | BODY MASS INDEX: 27.17 KG/M2

## 2025-04-29 DIAGNOSIS — R93.89 ABNORMAL FINDING ON CT SCAN: ICD-10-CM

## 2025-04-29 DIAGNOSIS — Z98.890 HX OF EXCISION OF MASS: ICD-10-CM

## 2025-04-29 DIAGNOSIS — R19.01 RIGHT UPPER QUADRANT ABDOMINAL MASS: ICD-10-CM

## 2025-04-29 PROCEDURE — 3078F DIAST BP <80 MM HG: CPT | Performed by: SURGERY

## 2025-04-29 PROCEDURE — 99214 OFFICE O/P EST MOD 30 MIN: CPT | Performed by: SURGERY

## 2025-04-29 PROCEDURE — 3074F SYST BP LT 130 MM HG: CPT | Performed by: SURGERY

## 2025-05-01 ASSESSMENT — ENCOUNTER SYMPTOMS
NECK PAIN: 0
DOUBLE VISION: 0
ABDOMINAL PAIN: 0
WEIGHT LOSS: 0
DIARRHEA: 0
VOMITING: 0
ORTHOPNEA: 0
DEPRESSION: 0
NERVOUS/ANXIOUS: 0
BACK PAIN: 0
HEADACHES: 0
EYE PAIN: 0
FEVER: 0
TINGLING: 0
PALPITATIONS: 0
CLAUDICATION: 0
COUGH: 0
BRUISES/BLEEDS EASILY: 0
NAUSEA: 0
HEMOPTYSIS: 0
HEARTBURN: 0
DIZZINESS: 0
HALLUCINATIONS: 0
FOCAL WEAKNESS: 0
TREMORS: 0
PHOTOPHOBIA: 0
SPUTUM PRODUCTION: 0
CHILLS: 0
CONSTIPATION: 0
EYE DISCHARGE: 0
SENSORY CHANGE: 0
SPEECH CHANGE: 0
MYALGIAS: 0
BLURRED VISION: 0

## 2025-05-01 ASSESSMENT — LIFESTYLE VARIABLES: SUBSTANCE_ABUSE: 0

## 2025-06-19 ENCOUNTER — APPOINTMENT (OUTPATIENT)
Dept: URBAN - METROPOLITAN AREA CLINIC 6 | Facility: CLINIC | Age: 40
Setting detail: DERMATOLOGY
End: 2025-06-19

## 2025-06-19 DIAGNOSIS — L20.89 OTHER ATOPIC DERMATITIS: ICD-10-CM

## 2025-06-19 PROCEDURE — ? PRESCRIPTION

## 2025-06-19 RX ORDER — FLUOCINOLONE ACETONIDE 0.01 MG/100ML
OIL TOPICAL
Qty: 118.28 | Refills: 1 | Status: ERX | COMMUNITY
Start: 2025-06-19

## 2025-06-19 RX ADMIN — FLUOCINOLONE ACETONIDE: 0.01 OIL TOPICAL at 00:00

## (undated) DEVICE — NEPTUNE 4 PORT MANIFOLD - (20/PK)

## (undated) DEVICE — CANISTER SUCTION RIGID RED 1500CC (40EA/CA)

## (undated) DEVICE — PATTIES SURG X-RAYCOTTONOID - 1/2 X 3 IN (200/CA)

## (undated) DEVICE — KIT ANESTHESIA W/CIRCUIT & 3/LT BAG W/FILTER (20EA/CA)

## (undated) DEVICE — TUBE E-T HI-LO CUFF 7.5MM (10EA/PK)

## (undated) DEVICE — SUTURE 3-0 VICRYL PLUS SH - 8X 18 INCH (12/BX)

## (undated) DEVICE — CATHETER IV 20 GA X 1-1/4 ---SURG.& SDS ONLY--- (50EA/BX)

## (undated) DEVICE — ELECTRODE 850 FOAM ADHESIVE - HYDROGEL RADIOTRNSPRNT (50/PK)

## (undated) DEVICE — APPLICATOR COTTONTIP 3 IN - STERILE (10EA/PK 100PK/CA)

## (undated) DEVICE — GLOVE SZ 7.5 BIOGEL PI MICRO - PF LF (50PR/BX)

## (undated) DEVICE — TUBING CLEARLINK DUO-VENT - C-FLO (48EA/CA)

## (undated) DEVICE — ELECTRODE DUAL RETURN W/ CORD - (50/PK)

## (undated) DEVICE — TUBE CONNECTING SUCTION - CLEAR PLASTIC STERILE 72 IN (50EA/CA)

## (undated) DEVICE — ANTI-FOG SOLUTION - 60BTL/CA

## (undated) DEVICE — MASK ANESTHESIA ADULT  - (100/CA)

## (undated) DEVICE — KIT ROOM DECONTAMINATION

## (undated) DEVICE — MIDAS LUBRICATOR DIFFUSER PACK (4EA/CA)

## (undated) DEVICE — GLOVE BIOGEL PI INDICATOR SZ 6.0 SURGICAL PF LF -(200PR/CA)

## (undated) DEVICE — TOWEL STOP TIMEOUT SAFETY FLAG (40EA/CA)

## (undated) DEVICE — SUTURE GENERAL

## (undated) DEVICE — LACTATED RINGERS INJ 1000 ML - (14EA/CA 60CA/PF)

## (undated) DEVICE — SET EXTENSION WITH 2 PORTS (48EA/CA) ***PART #2C8610 IS A SUBSTITUTE*****

## (undated) DEVICE — GOWN WARMING STANDARD FLEX - (30/CA)

## (undated) DEVICE — PACK NEURO - (2EA/CA)

## (undated) DEVICE — CHLORAPREP 26 ML APPLICATOR - ORANGE TINT(25/CA)

## (undated) DEVICE — SLEEVE VASO DVT COMPRESSION CALF MED - (10PR/CA)

## (undated) DEVICE — PROTECTOR ULNA NERVE - (36PR/CA)

## (undated) DEVICE — SUCTION INSTRUMENT YANKAUER BULBOUS TIP W/O VENT (50EA/CA)

## (undated) DEVICE — SUTURE 2-0 SILK FS (12EA/BX)

## (undated) DEVICE — PACK MAJOR BASIN - (2EA/CA)

## (undated) DEVICE — BOVIE FOOT CONTROL SUCTION - 6IN 10FR (25EA/CA)

## (undated) DEVICE — SLEEVE VASO CALF MED - (10PR/CA)

## (undated) DEVICE — SET LEADWIRE 5 LEAD BEDSIDE DISPOSABLE ECG (1SET OF 5/EA)

## (undated) DEVICE — SPONGE GAUZESTER. 2X2 4-PL - (2/PK 50PK/BX 30BX/CS)

## (undated) DEVICE — CANISTER SUCTION 3000ML MECHANICAL FILTER AUTO SHUTOFF MEDI-VAC NONSTERILE LF DISP  (40EA/CA)

## (undated) DEVICE — DERMABOND ADVANCED - (12EA/BX)

## (undated) DEVICE — SYRINGE SAFETY 10 ML 18 GA X 1 1/2 BLUNT LL (100/BX 4BX/CA)

## (undated) DEVICE — SENSOR SPO2 NEO LNCS ADHESIVE (20/BX) SEE USER NOTES

## (undated) DEVICE — SUTURE 0 VICRYL PLUS CT-2 - 8 X 18 INCH (12/BX)

## (undated) DEVICE — DRAPE STRLE REG TOWEL 18X24 - (10/BX 4BX/CA)"

## (undated) DEVICE — BOVIE BLADE COATED - (50/PK)

## (undated) DEVICE — PACK ENT OR - (2EA/CA)

## (undated) DEVICE — SODIUM CHL IRRIGATION 0.9% 1000ML (12EA/CA)

## (undated) DEVICE — SUTURE 4-0 MONOCRYL PLUS PS-1 - 27 INCH (36/BX)

## (undated) DEVICE — GOWN SURGICAL XX-LARGE - (28EA/CA) SIRUS NON REINFORCED

## (undated) DEVICE — SYRINGE SAFETY 5 ML 18 GA X 1-1/2 BLUNT LL (100/BX 4BX/CA)

## (undated) DEVICE — BOVIE BLADE COATED &INSULATED - 25/PK

## (undated) DEVICE — TOOL DISSECT MATCH HEAD

## (undated) DEVICE — CORDS BIPOLAR COAGULATION - 12FT STERILE DISP. (10EA/BX)

## (undated) DEVICE — SUTURE 4-0 MONOCRYL PLUS PS-2 - 27 INCH (36/BX)

## (undated) DEVICE — WATER IRRIGATION STERILE 1000ML (12EA/CA)

## (undated) DEVICE — KIT  I.V. START (100EA/CA)

## (undated) DEVICE — KIT SURGIFLO W/OUT THROMBIN - (6EA/CA)

## (undated) DEVICE — SOD. CHL. INJ. 0.9% 250 ML - (36/CA 50CA/PF)

## (undated) DEVICE — GLOVE SZ 6 BIOGEL PI MICRO - PF LF (50PR/BX 4BX/CA)

## (undated) DEVICE — LACTATED RINGERS INJ. 500 ML - (24EA/CA)

## (undated) DEVICE — SPLINT NASAL DOYLE AIRWAY (2EA/ST)

## (undated) DEVICE — HEADREST PRONEVIEW LARGE - (10/CA)

## (undated) DEVICE — DRAPE IOBAN II INCISE 23X17 - (10EA/BX 4BX/CA)

## (undated) DEVICE — PAD LAP STERILE 18 X 18 - (5/PK 40PK/CA)

## (undated) DEVICE — SUTURE 7-0 VICRYL TG140-8 (12PK/BX)

## (undated) DEVICE — SYRINGE ASEPTO - (50EA/CA

## (undated) DEVICE — ARMREST CRADLE FOAM - (2PR/PK 12PR/CA)

## (undated) DEVICE — PACK JACKSON TABLE KIT W/OUT - HR (6EA/CA)

## (undated) DEVICE — SLEEVE, VASO, THIGH, MED

## (undated) DEVICE — DRESSING TRANSPARENT FILM TEGADERM 4 X 4.75" (50EA/BX)"

## (undated) DEVICE — BLADE INFERIOR TURBINATE 2MM  (5EA/PK)

## (undated) DEVICE — GLOVE BIOGEL SZ 8.5 SURGICAL PF LTX - (50PR/BX 4BX/CA)

## (undated) DEVICE — HEAD HOLDER JUNIOR/ADULT

## (undated) DEVICE — GLOVE SZ 8 BIOGEL PI MICRO - PF LF (50PR/BX)